# Patient Record
Sex: FEMALE | Race: OTHER | HISPANIC OR LATINO | URBAN - METROPOLITAN AREA
[De-identification: names, ages, dates, MRNs, and addresses within clinical notes are randomized per-mention and may not be internally consistent; named-entity substitution may affect disease eponyms.]

---

## 2019-08-23 ENCOUNTER — INPATIENT (INPATIENT)
Facility: HOSPITAL | Age: 74
LOS: 4 days | Discharge: HOME | DRG: 419 | End: 2019-08-28
Attending: SURGERY | Admitting: SURGERY
Payer: MEDICARE

## 2019-08-23 VITALS
RESPIRATION RATE: 16 BRPM | WEIGHT: 115.96 LBS | SYSTOLIC BLOOD PRESSURE: 147 MMHG | HEIGHT: 60 IN | HEART RATE: 76 BPM | OXYGEN SATURATION: 99 % | TEMPERATURE: 99 F | DIASTOLIC BLOOD PRESSURE: 64 MMHG

## 2019-08-23 DIAGNOSIS — K80.20 CALCULUS OF GALLBLADDER WITHOUT CHOLECYSTITIS WITHOUT OBSTRUCTION: ICD-10-CM

## 2019-08-23 LAB
ALBUMIN SERPL ELPH-MCNC: 3.3 G/DL — SIGNIFICANT CHANGE UP (ref 3.3–5)
ALP SERPL-CCNC: 162 U/L — HIGH (ref 30–120)
ALT FLD-CCNC: 388 U/L DA — HIGH (ref 10–60)
AMYLASE P1 CFR SERPL: 72 U/L — SIGNIFICANT CHANGE UP (ref 25–125)
ANION GAP SERPL CALC-SCNC: 6 MMOL/L — SIGNIFICANT CHANGE UP (ref 5–17)
APPEARANCE UR: CLEAR — SIGNIFICANT CHANGE UP
AST SERPL-CCNC: 212 U/L — HIGH (ref 10–40)
BASOPHILS # BLD AUTO: 0.05 K/UL — SIGNIFICANT CHANGE UP (ref 0–0.2)
BASOPHILS NFR BLD AUTO: 0.8 % — SIGNIFICANT CHANGE UP (ref 0–2)
BILIRUB SERPL-MCNC: 0.4 MG/DL — SIGNIFICANT CHANGE UP (ref 0.2–1.2)
BILIRUB UR-MCNC: NEGATIVE — SIGNIFICANT CHANGE UP
BUN SERPL-MCNC: 35 MG/DL — HIGH (ref 7–23)
CALCIUM SERPL-MCNC: 8.7 MG/DL — SIGNIFICANT CHANGE UP (ref 8.4–10.5)
CHLORIDE SERPL-SCNC: 107 MMOL/L — SIGNIFICANT CHANGE UP (ref 96–108)
CO2 SERPL-SCNC: 26 MMOL/L — SIGNIFICANT CHANGE UP (ref 22–31)
COLOR SPEC: YELLOW — SIGNIFICANT CHANGE UP
CREAT SERPL-MCNC: 0.94 MG/DL — SIGNIFICANT CHANGE UP (ref 0.5–1.3)
DIFF PNL FLD: NEGATIVE — SIGNIFICANT CHANGE UP
EOSINOPHIL # BLD AUTO: 0.2 K/UL — SIGNIFICANT CHANGE UP (ref 0–0.5)
EOSINOPHIL NFR BLD AUTO: 3.2 % — SIGNIFICANT CHANGE UP (ref 0–6)
GLUCOSE SERPL-MCNC: 101 MG/DL — HIGH (ref 70–99)
GLUCOSE UR QL: NEGATIVE MG/DL — SIGNIFICANT CHANGE UP
HCT VFR BLD CALC: 34 % — LOW (ref 34.5–45)
HGB BLD-MCNC: 11.1 G/DL — LOW (ref 11.5–15.5)
IMM GRANULOCYTES NFR BLD AUTO: 0.2 % — SIGNIFICANT CHANGE UP (ref 0–1.5)
KETONES UR-MCNC: NEGATIVE — SIGNIFICANT CHANGE UP
LACTATE SERPL-SCNC: 0.5 MMOL/L — LOW (ref 0.7–2)
LEUKOCYTE ESTERASE UR-ACNC: NEGATIVE — SIGNIFICANT CHANGE UP
LIDOCAIN IGE QN: 100 U/L — SIGNIFICANT CHANGE UP (ref 73–393)
LYMPHOCYTES # BLD AUTO: 2.36 K/UL — SIGNIFICANT CHANGE UP (ref 1–3.3)
LYMPHOCYTES # BLD AUTO: 37.9 % — SIGNIFICANT CHANGE UP (ref 13–44)
MCHC RBC-ENTMCNC: 29.1 PG — SIGNIFICANT CHANGE UP (ref 27–34)
MCHC RBC-ENTMCNC: 32.6 GM/DL — SIGNIFICANT CHANGE UP (ref 32–36)
MCV RBC AUTO: 89.2 FL — SIGNIFICANT CHANGE UP (ref 80–100)
MONOCYTES # BLD AUTO: 0.83 K/UL — SIGNIFICANT CHANGE UP (ref 0–0.9)
MONOCYTES NFR BLD AUTO: 13.3 % — SIGNIFICANT CHANGE UP (ref 2–14)
NEUTROPHILS # BLD AUTO: 2.78 K/UL — SIGNIFICANT CHANGE UP (ref 1.8–7.4)
NEUTROPHILS NFR BLD AUTO: 44.6 % — SIGNIFICANT CHANGE UP (ref 43–77)
NITRITE UR-MCNC: NEGATIVE — SIGNIFICANT CHANGE UP
NRBC # BLD: 0 /100 WBCS — SIGNIFICANT CHANGE UP (ref 0–0)
PH UR: 6.5 — SIGNIFICANT CHANGE UP (ref 5–8)
PLATELET # BLD AUTO: 278 K/UL — SIGNIFICANT CHANGE UP (ref 150–400)
POTASSIUM SERPL-MCNC: 5.2 MMOL/L — SIGNIFICANT CHANGE UP (ref 3.5–5.3)
POTASSIUM SERPL-SCNC: 5.2 MMOL/L — SIGNIFICANT CHANGE UP (ref 3.5–5.3)
PROT SERPL-MCNC: 7.3 G/DL — SIGNIFICANT CHANGE UP (ref 6–8.3)
PROT UR-MCNC: NEGATIVE MG/DL — SIGNIFICANT CHANGE UP
RBC # BLD: 3.81 M/UL — SIGNIFICANT CHANGE UP (ref 3.8–5.2)
RBC # FLD: 13.3 % — SIGNIFICANT CHANGE UP (ref 10.3–14.5)
SODIUM SERPL-SCNC: 139 MMOL/L — SIGNIFICANT CHANGE UP (ref 135–145)
SP GR SPEC: 1.01 — SIGNIFICANT CHANGE UP (ref 1.01–1.02)
UROBILINOGEN FLD QL: NEGATIVE MG/DL — SIGNIFICANT CHANGE UP
WBC # BLD: 6.23 K/UL — SIGNIFICANT CHANGE UP (ref 3.8–10.5)
WBC # FLD AUTO: 6.23 K/UL — SIGNIFICANT CHANGE UP (ref 3.8–10.5)

## 2019-08-23 PROCEDURE — 99285 EMERGENCY DEPT VISIT HI MDM: CPT

## 2019-08-23 PROCEDURE — 71046 X-RAY EXAM CHEST 2 VIEWS: CPT | Mod: 26

## 2019-08-23 PROCEDURE — 93010 ELECTROCARDIOGRAM REPORT: CPT

## 2019-08-23 PROCEDURE — 76700 US EXAM ABDOM COMPLETE: CPT | Mod: 26

## 2019-08-23 RX ORDER — PIPERACILLIN AND TAZOBACTAM 4; .5 G/20ML; G/20ML
3.38 INJECTION, POWDER, LYOPHILIZED, FOR SOLUTION INTRAVENOUS ONCE
Refills: 0 | Status: COMPLETED | OUTPATIENT
Start: 2019-08-23 | End: 2019-08-23

## 2019-08-23 RX ORDER — MORPHINE SULFATE 50 MG/1
2 CAPSULE, EXTENDED RELEASE ORAL EVERY 4 HOURS
Refills: 0 | Status: DISCONTINUED | OUTPATIENT
Start: 2019-08-23 | End: 2019-08-28

## 2019-08-23 RX ORDER — SODIUM CHLORIDE 9 MG/ML
1000 INJECTION INTRAMUSCULAR; INTRAVENOUS; SUBCUTANEOUS ONCE
Refills: 0 | Status: COMPLETED | OUTPATIENT
Start: 2019-08-23 | End: 2019-08-23

## 2019-08-23 RX ORDER — LISINOPRIL 2.5 MG/1
40 TABLET ORAL DAILY
Refills: 0 | Status: DISCONTINUED | OUTPATIENT
Start: 2019-08-23 | End: 2019-08-27

## 2019-08-23 RX ORDER — CIPROFLOXACIN LACTATE 400MG/40ML
400 VIAL (ML) INTRAVENOUS EVERY 12 HOURS
Refills: 0 | Status: DISCONTINUED | OUTPATIENT
Start: 2019-08-23 | End: 2019-08-28

## 2019-08-23 RX ORDER — SODIUM CHLORIDE 9 MG/ML
1000 INJECTION, SOLUTION INTRAVENOUS
Refills: 0 | Status: DISCONTINUED | OUTPATIENT
Start: 2019-08-23 | End: 2019-08-28

## 2019-08-23 RX ORDER — ENOXAPARIN SODIUM 100 MG/ML
40 INJECTION SUBCUTANEOUS DAILY
Refills: 0 | Status: DISCONTINUED | OUTPATIENT
Start: 2019-08-24 | End: 2019-08-28

## 2019-08-23 RX ADMIN — PIPERACILLIN AND TAZOBACTAM 200 GRAM(S): 4; .5 INJECTION, POWDER, LYOPHILIZED, FOR SOLUTION INTRAVENOUS at 20:23

## 2019-08-23 RX ADMIN — SODIUM CHLORIDE 250 MILLILITER(S): 9 INJECTION INTRAMUSCULAR; INTRAVENOUS; SUBCUTANEOUS at 20:23

## 2019-08-23 NOTE — ED PROVIDER NOTE - ATTENDING CONTRIBUTION TO CARE
Scribe Alexa Bromberg for attending Dr. Ramirez: 73 y/o female with a PMHx of HTN presents to the ED for abnormal lab results. Two weeks ago she had blood tests that said she had an abnormal liver. Blood tests were drawn for a routine colonoscopy. Doctor had her get an US which was fine but they saw gallstones. Yesterday pt went back for an annual physical and the blood work showed even more elevations in liver enzymes. Pt didn't have the colonoscopy because they saw that her heart was slightly off. Doctor wanted pt to see cardiologist. Pt denies fever but her feet get cold. Pt denies cp or SOB. Pt states the right side of her abd is tender. Pt has had no n/v/d. Sometimes the abd pain gets worse after she eats but not all the time. No EtOH. Nonsmoker. 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 Will obtain screening labs, gallbladder sono, and monitor. Scribe Alexa Bromberg for attending Dr. Ramirez: 73 y/o female with a PMHx of HTN presents to the ED for abnormal lab results. Two weeks ago she had routine blood work for a colonoscopy and were told that her LFTs were elevated.  Her PMD had her follow up for an ultrasound and she was told that gallstones were noted. Yesterday pt went back for an annual physical and the blood work showed her LFTs were double what they were on the prior tests per report. Pt has not had the colonoscopy yet. . Pt denies fevers but reports chills. Pt denies cp or SOB. Pt states the right side of her abd is tender. Pt has had no n/v/d. Sometimes the abd pain gets worse after she eats but not all the time. No EtOH. Nonsmoker. 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 MDM- 73 y/o with isolated transaminitis and upper abd pain appears to be worsening over the last several days although we do not have a baseline. Will obtain screening labs, gallbladder sono, and monitor.  Agree with above plan of care

## 2019-08-23 NOTE — H&P ADULT - NSHPLABSRESULTS_GEN_ALL_CORE
< from: US Abdomen Complete (08.23.19 @ 19:12) >    XAM:  US ABDOMEN COMPLETE                                  PROCEDURE DATE:  08/23/2019          INTERPRETATION:  Ultrasound of the abdomen       CLINICAL INFORMATION:  Abdominal  Pain., Elevated LFTs    TECHNIQUE:   Transcutaneous ultrasonography of the abdomen was performed.    FINDINGS:    No previous examinations are available for review.    There is diffuse fatty infiltration of liver parenchyma.    Hepatic size and contours are maintained.  Hepatic and portal veins   appear patent and arenot displaced.  No intrahepatic or ductal   dilatation is found.  The common duct  measures 0.6 cm.       The gallbladder  partially contracted with echogenic 3.8 mm foci near the   neck of the gallbladder which may represent a sludge ball or gallstone.    Gallbladder wall measures 3 mm.     The pancreatic head, neck, and proximal body are intact. The midbody and   tail of pancreas are obscured by bowel gas.   The spleen size not visualized due to bowel gas.    The right kidney measures 9.9 cm inlength.  It demonstrates no mass,   calculus or hydronephrosis.      The left kidney measures 9.3 cm in length.  It demonstrates no mass,   calculus or hydronephrosis.  No ascites.  Visualized segments of abdominal aorta are within normal limits by   sonographic criteria. IVC is unremarkable. No retroperitoneal mass seen.      IMPRESSION:   Gallbladder contracted with echogenic foci within   gallbladder neck either representing gallstone or sludge ball.   Gallbladder wall 3 mm. Common bile duct 6mm.  Fatty infiltration of liver.  Unable to visualize spleen due to bowel gas.                    BRODERICK RANKIN M.D., ATTENDING RADIOLOGIST  This document has been electronically signed. Aug 23 2019  7:41PM              < end of copied text >

## 2019-08-23 NOTE — ED ADULT NURSE NOTE - OBJECTIVE STATEMENT
Amb to ED. Pt's daughter states she received a phone call today that labs drawn yesterday for her physical had elevated liver enzymes. Pt denies any pain. No nausea, fever or diarrhea. Color fair. Skin warm & dry to touch. Lungs clear. Abd soft with intermittent mild tenderness in RLQ with palpation.

## 2019-08-23 NOTE — ED PROVIDER NOTE - CHPI ED SYMPTOMS NEG
no vomiting/no burning urination/no diarrhea/no fever/no chills/no dysuria/no hematuria/no nausea/no blood in stool

## 2019-08-23 NOTE — ED PROVIDER NOTE - INTERPRETATION SERVICES DECLINED
Lab appointment already scheduled.     Routed to provider for orders for annual labs.    Trinh Young RN  [facility]     Patient/Caregiver requests family/friend to interpret.

## 2019-08-23 NOTE — ED PROVIDER NOTE - OBJECTIVE STATEMENT
73 y/o F with hx of HTN, gastritis referred by PMD for eval of elevated LFTs and potassium x 1 day. As per daughter at bedside, pt had routine check up with PMD, Dr. Morgan Roe yesterday and noted lfts were elevated (ast: 346, ast: 210 and alk phos: 156) potassium was 5.7. States that pt was supposed to have colonoscopy and had pre procedure labs with her GI, Dr. Smith 2 weeks ago, noted to have elevated lfts, had abdominal sono which showed a gallstone but states that lfts are more elevated on blood test from yesterday. States that pt has hx of intermittent R upper abdominal pain radiating to back but worse over the past few days. Denies hx of abdominal surgeries, fever, chills, n/v/d, urinary symptoms, CP, SOB or other symptoms.

## 2019-08-23 NOTE — H&P ADULT - ASSESSMENT
IMPRESSION cholecystitis and lithiasis  PLAN cardiac evaluation            check repeat LFT's           GI evaluation

## 2019-08-23 NOTE — ED ADULT NURSE NOTE - NS ED NURSE RECORD ANOTHER HT AND WT
Problem: Impaired Functional Mobility  Goal: Achieve highest/safest level of mobility/gait  Interventions:  - Assess patient's functional ability and stability  - Promote increasing activity/tolerance for mobility and gait  - Educate and engage patient/fam Yes

## 2019-08-23 NOTE — ED PROVIDER NOTE - CLINICAL SUMMARY MEDICAL DECISION MAKING FREE TEXT BOX
73 y/o F with hx of HTN sent by pmd for elevated lfts and potassium, has had upper abdominal pain, worsening, noted to have elevated lfts and gallstone on US 2 weeks ago by GI, no fever/n/v/d, will get labs, abdominal US, surgical consult, re-assess

## 2019-08-23 NOTE — ED PROVIDER NOTE - PROGRESS NOTE DETAILS
Scribe Alexa Bromberg for attending Dr. Ramirez: 75 y/o female with a PMHx of HTN presents to the ED for abnormal lab results. Two weeks ago she had blood tests that said she had an abnormal liver. Blood tests were drawn for a routine colonoscopy. Doctor had her get an US which was fine but they saw gallstones. Yesterday pt went back for an annual physical and the blood work showed even more elevations in liver enzymes. Pt didn't have the colonoscopy because they saw that her heart was slightly off. Doctor wanted pt to see cardiologist. Pt denies fever but her feet get cold. Pt denies cp or SOB. Pt states the right side of her abd is tender. Pt has had no n/v/d. Sometimes the abd pain gets worse after she eats but not all the time. No EtOH. Nonsmoker. 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 Will obtain screening labs, gallbladder sono, and monitor. Spoke to surgeon, Dr. Polo who advised to admit pt to his service, he will call GI for consult and ERCP. Pt examined by ED attending, Dr. Carrizales who agreed with disposition and plan. Spoke to surgeon, Dr. Polo who advised to admit pt to his service, he will call GI for consult and ERCP.  Pt refused pain meds in ED.

## 2019-08-23 NOTE — H&P ADULT - HISTORY OF PRESENT ILLNESS
74 year old female who presents c/o abdominal pain and elevated LFT's.  Pt states that over the last 6 months she has been having abdominal pain- her pain was on the right flank with radiation  to her back.  Denies fevers, chills or night sweats.  Has no history of jaundice but had elevated LFT's on routine lab work.  No family history of cholelithiasis.

## 2019-08-24 LAB
ALBUMIN SERPL ELPH-MCNC: 2.8 G/DL — LOW (ref 3.3–5)
ALP SERPL-CCNC: 129 U/L — HIGH (ref 30–120)
ALT FLD-CCNC: 338 U/L DA — HIGH (ref 10–60)
AMYLASE P1 CFR SERPL: 68 U/L — SIGNIFICANT CHANGE UP (ref 25–125)
ANION GAP SERPL CALC-SCNC: 6 MMOL/L — SIGNIFICANT CHANGE UP (ref 5–17)
AST SERPL-CCNC: 179 U/L — HIGH (ref 10–40)
BILIRUB SERPL-MCNC: 0.3 MG/DL — SIGNIFICANT CHANGE UP (ref 0.2–1.2)
BUN SERPL-MCNC: 25 MG/DL — HIGH (ref 7–23)
CALCIUM SERPL-MCNC: 8.6 MG/DL — SIGNIFICANT CHANGE UP (ref 8.4–10.5)
CHLORIDE SERPL-SCNC: 111 MMOL/L — HIGH (ref 96–108)
CO2 SERPL-SCNC: 26 MMOL/L — SIGNIFICANT CHANGE UP (ref 22–31)
CREAT SERPL-MCNC: 1.03 MG/DL — SIGNIFICANT CHANGE UP (ref 0.5–1.3)
GLUCOSE SERPL-MCNC: 87 MG/DL — SIGNIFICANT CHANGE UP (ref 70–99)
HCT VFR BLD CALC: 32.9 % — LOW (ref 34.5–45)
HCV AB S/CO SERPL IA: 0.06 S/CO — SIGNIFICANT CHANGE UP (ref 0–0.99)
HCV AB SERPL-IMP: SIGNIFICANT CHANGE UP
HGB BLD-MCNC: 10.9 G/DL — LOW (ref 11.5–15.5)
LIDOCAIN IGE QN: 86 U/L — SIGNIFICANT CHANGE UP (ref 73–393)
MCHC RBC-ENTMCNC: 29.1 PG — SIGNIFICANT CHANGE UP (ref 27–34)
MCHC RBC-ENTMCNC: 33.1 GM/DL — SIGNIFICANT CHANGE UP (ref 32–36)
MCV RBC AUTO: 88 FL — SIGNIFICANT CHANGE UP (ref 80–100)
NRBC # BLD: 0 /100 WBCS — SIGNIFICANT CHANGE UP (ref 0–0)
PLATELET # BLD AUTO: 295 K/UL — SIGNIFICANT CHANGE UP (ref 150–400)
POTASSIUM SERPL-MCNC: 4.5 MMOL/L — SIGNIFICANT CHANGE UP (ref 3.5–5.3)
POTASSIUM SERPL-SCNC: 4.5 MMOL/L — SIGNIFICANT CHANGE UP (ref 3.5–5.3)
PROT SERPL-MCNC: 6.1 G/DL — SIGNIFICANT CHANGE UP (ref 6–8.3)
RBC # BLD: 3.74 M/UL — LOW (ref 3.8–5.2)
RBC # FLD: 13.3 % — SIGNIFICANT CHANGE UP (ref 10.3–14.5)
SODIUM SERPL-SCNC: 143 MMOL/L — SIGNIFICANT CHANGE UP (ref 135–145)
WBC # BLD: 6.31 K/UL — SIGNIFICANT CHANGE UP (ref 3.8–10.5)
WBC # FLD AUTO: 6.31 K/UL — SIGNIFICANT CHANGE UP (ref 3.8–10.5)

## 2019-08-24 RX ADMIN — Medication 200 MILLIGRAM(S): at 05:50

## 2019-08-24 RX ADMIN — LISINOPRIL 40 MILLIGRAM(S): 2.5 TABLET ORAL at 05:50

## 2019-08-24 RX ADMIN — SODIUM CHLORIDE 50 MILLILITER(S): 9 INJECTION, SOLUTION INTRAVENOUS at 19:51

## 2019-08-24 RX ADMIN — Medication 200 MILLIGRAM(S): at 18:06

## 2019-08-24 RX ADMIN — SODIUM CHLORIDE 50 MILLILITER(S): 9 INJECTION, SOLUTION INTRAVENOUS at 05:51

## 2019-08-24 RX ADMIN — ENOXAPARIN SODIUM 40 MILLIGRAM(S): 100 INJECTION SUBCUTANEOUS at 18:07

## 2019-08-24 NOTE — CONSULT NOTE ADULT - ASSESSMENT
cbd stone  gb disease  avoid all non essential hepatotoxic drugs	  Patient presented with persistent abnormal LFT  Obtain ABD sonogram  Check viral hepatitis panel  BERTIN,ASMA,AMA  Iron studies, celiac serologies  AFP, alpha 1 antitrypsin level, ceruloplasmin    Potential liver biopsy in future may be needed if persistent elevated liver function.    diet and wt control  avoid all hepatotoxic drugs also  avoid alcohol and herbel medication.  follow up liver function test.    mrcp to be done

## 2019-08-24 NOTE — CONSULT NOTE ADULT - SUBJECTIVE AND OBJECTIVE BOX
History of Present Illness: The patient is a 74 year old female with a history of HTN, gastritis who presented with abnormal LFTs. She had been having intermittent abdominal pain. She denies chest pain or shortness of breath. She was told by her PMD that she had a change in her ECG from prior but unable to describe further.    Past Medical/Surgical History:  HTN, gastritis    Medications:  Home Medications:  chlorthalidone 25 mg oral tablet: 1 tab(s) orally once a day (23 Aug 2019 20:18)  lisinopril 40 mg oral tablet: 1 tab(s) orally once a day (23 Aug 2019 20:18)      Family History: Non-contributory family history of premature cardiovascular atherosclerotic disease    Social History: No tobacco, alcohol or drug use    Review of Systems:  General: No fevers, chills, weight loss or gain  Skin: No rashes, color changes  Cardiovascular: No chest pain, orthopnea  Respiratory: No shortness of breath, cough  Gastrointestinal: No nausea, abdominal pain  Genitourinary: No incontinence, pain with urination  Musculoskeletal: No pain, swelling, decreased range of motion  Neurological: No headache, weakness  Psychiatric: No depression, anxiety  Endocrine: No weight loss or gain, increased thirst  All other systems are comprehensively negative.    Physical Exam:  Vitals:        Vital Signs Last 24 Hrs  T(C): 36.8 (24 Aug 2019 08:08), Max: 37.1 (23 Aug 2019 17:28)  T(F): 98.2 (24 Aug 2019 08:08), Max: 98.8 (23 Aug 2019 17:28)  HR: 59 (24 Aug 2019 08:08) (59 - 76)  BP: 117/71 (24 Aug 2019 08:08) (111/54 - 160/70)  BP(mean): --  RR: 16 (24 Aug 2019 08:08) (16 - 16)  SpO2: 98% (24 Aug 2019 08:08) (96% - 99%)  General: NAD  HEENT: MMM  Neck: No JVD, no carotid bruit  Lungs: CTAB  CV: RRR, nl S1/S2, no M/R/G  Abdomen: S/NT/ND, +BS  Extremities: No LE edema, no cyanosis  Neuro: AAOx3, non-focal  Skin: No rash    Labs:                        10.9   6.31  )-----------( 295      ( 24 Aug 2019 06:46 )             32.9     08-24    143  |  111<H>  |  25<H>  ----------------------------<  87  4.5   |  26  |  1.03    Ca    8.6      24 Aug 2019 06:46    TPro  6.1  /  Alb  2.8<L>  /  TBili  0.3  /  DBili  x   /  AST  179<H>  /  ALT  338<H>  /  AlkPhos  129<H>  08-24            ECG: NSR, normal axis, PAC, RBBB

## 2019-08-24 NOTE — CONSULT NOTE ADULT - SUBJECTIVE AND OBJECTIVE BOX
Chief Complaint:  Patient is a 74y old  Female who presents with a chief complaint of increased LFT's, abdominal pain 74 year old female who presents c/o abdominal pain and elevated LFT's.  Pt states that over the last 6 months she has been having abdominal pain- her pain was on the right flank with radiation  to her back.  Denies fevers, chills or night sweats.  Has no history of jaundice but had elevated LFT's on routine lab work.  No family history of cholelithiasis.     Review of Systems:  · Negative General Symptoms	no fever; no chills	  · Skin/Breast	negative	  · Ophthalmologic	negative	  · ENMT Comments	s/p cataracts	  · Respiratory and Thorax	negative	  · Cardiovascular	negative	  · Negative Gastrointestinal Symptoms	no nausea; no vomiting; no change in bowel habits	  · Gastrointestinal Symptoms	abdominal pain	  · Genitourinary	negative	  · Musculoskeletal	negative	  · Neurological	negative	  · Psychiatric	negative	  · Hematology/Lymphatics	negative	  · Endocrine	negative	  · Allergic/Immunologic	negative	  inc lfts ? cbd stone    Allergies:  No Known Allergies      Medications:  ciprofloxacin   IVPB 400 milliGRAM(s) IV Intermittent every 12 hours  enoxaparin Injectable 40 milliGRAM(s) SubCutaneous daily  lactated ringers. 1000 milliLiter(s) IV Continuous <Continuous>  lisinopril 40 milliGRAM(s) Oral daily  morphine  - Injectable 2 milliGRAM(s) IV Push every 4 hours PRN      PMHX/PSHX:  Hypertension, unspecified type  No significant past surgical history      Family history:  no uc no cd liver disease    Social History: no etoh no cigs no ivda    ROS:     General:  No wt loss, fevers, chills, night sweats, fatigue,   Eyes:  Good vision, no reported pain  ENT:  No sore throat, pain, runny nose, dysphagia  CV:  No pain, palpitations, hypo/hypertension  Resp:  No dyspnea, cough, tachypnea, wheezing  GI:  No pain, No nausea, No vomiting, No diarrhea, No constipation, No weight loss, No fever, No pruritis, No rectal bleeding, No tarry stools, No dysphagia,  :  No pain, bleeding, incontinence, nocturia  Muscle:  No pain, weakness  Neuro:  No weakness, tingling, memory problems  Psych:  No fatigue, insomnia, mood problems, depression  Endocrine:  No polyuria, polydipsia, cold/heat intolerance  Heme:  No petechiae, ecchymosis, easy bruisability  Skin:  No rash, tattoos, scars, edema      PHYSICAL EXAM:   Vital Signs:  Vital Signs Last 24 Hrs  T(C): 36.8 (24 Aug 2019 08:08), Max: 37.1 (23 Aug 2019 17:28)  T(F): 98.2 (24 Aug 2019 08:08), Max: 98.8 (23 Aug 2019 17:28)  HR: 59 (24 Aug 2019 08:08) (59 - 76)  BP: 117/71 (24 Aug 2019 08:08) (111/54 - 160/70)  BP(mean): --  RR: 16 (24 Aug 2019 08:08) (16 - 16)  SpO2: 98% (24 Aug 2019 08:08) (96% - 99%)  Daily Height in cm: 152.4 (23 Aug 2019 17:28)    Daily     GENERAL:  Appears stated age, well-groomed, well-nourished, no distress  HEENT:  NC/AT,  conjunctivae clear and pink, no thyromegaly, nodules, adenopathy, no JVD, sclera -anicteric  CHEST:  Full & symmetric excursion, no increased effort, breath sounds clear  HEART:  Regular rhythm, S1, S2, no murmur/rub/S3/S4, no abdominal bruit, no edema  ABDOMEN:  Soft, non-tender, non-distended, normoactive bowel sounds,  no masses ,no hepato-splenomegaly, no signs of chronic liver disease  EXTEREMITIES:  no cyanosis,clubbing or edema  SKIN:  No rash/erythema/ecchymoses/petechiae/wounds/abscess/warm/dry  NEURO:  Alert, oriented, no asterixis, no tremor, no encephalopathy    LABS:                        10.9   6.31  )-----------( 295      ( 24 Aug 2019 06:46 )             32.9     08-24    143  |  111<H>  |  25<H>  ----------------------------<  87  4.5   |  26  |  1.03    Ca    8.6      24 Aug 2019 06:46    TPro  6.1  /  Alb  2.8<L>  /  TBili  0.3  /  DBili  x   /  AST  179<H>  /  ALT  338<H>  /  AlkPhos  129<H>  08-    LIVER FUNCTIONS - ( 24 Aug 2019 06:46 )  Alb: 2.8 g/dL / Pro: 6.1 g/dL / ALK PHOS: 129 U/L / ALT: 338 U/L DA / AST: 179 U/L / GGT: x             Urinalysis Basic - ( 23 Aug 2019 20:37 )    Color: Yellow / Appearance: Clear / S.010 / pH: x  Gluc: x / Ketone: Negative  / Bili: Negative / Urobili: Negative mg/dL   Blood: x / Protein: Negative mg/dL / Nitrite: Negative   Leuk Esterase: Negative / RBC: x / WBC x   Sq Epi: x / Non Sq Epi: x / Bacteria: x      Amylase Serum68      Lipase serum86       Ammonia--  Amylase Serum72      Lipase [id number]       Ammonia--      Imaging:

## 2019-08-24 NOTE — CONSULT NOTE ADULT - ASSESSMENT
The patient is a 74 year old female with a history of HTN, gastritis who presented with abnormal LFTs, found to have cholecystitis.    Plan:  - ECG with RBBB, likely normal variant. No evidence of ischemia or infarction.  - Continue lisinopril 40 mg daily  - GI eval  - Plan for possible cholecystectomy. There are no active cardiac issues and the patient is asymptomatic from a cardiac perspective. She is at low/intermediate risk for cardiac events for an intermediate risk surgery. She is optimized to proceed without additional cardiac testing.

## 2019-08-25 LAB
ALBUMIN SERPL ELPH-MCNC: 3.1 G/DL — LOW (ref 3.3–5)
ALP SERPL-CCNC: 135 U/L — HIGH (ref 30–120)
ALT FLD-CCNC: 340 U/L DA — HIGH (ref 10–60)
AMYLASE P1 CFR SERPL: 66 U/L — SIGNIFICANT CHANGE UP (ref 25–125)
ANION GAP SERPL CALC-SCNC: 7 MMOL/L — SIGNIFICANT CHANGE UP (ref 5–17)
AST SERPL-CCNC: 181 U/L — HIGH (ref 10–40)
BILIRUB SERPL-MCNC: 0.3 MG/DL — SIGNIFICANT CHANGE UP (ref 0.2–1.2)
BLD GP AB SCN SERPL QL: SIGNIFICANT CHANGE UP
BLD GP AB SCN SERPL QL: SIGNIFICANT CHANGE UP
BUN SERPL-MCNC: 19 MG/DL — SIGNIFICANT CHANGE UP (ref 7–23)
CALCIUM SERPL-MCNC: 9.1 MG/DL — SIGNIFICANT CHANGE UP (ref 8.4–10.5)
CHLORIDE SERPL-SCNC: 109 MMOL/L — HIGH (ref 96–108)
CO2 SERPL-SCNC: 26 MMOL/L — SIGNIFICANT CHANGE UP (ref 22–31)
CREAT SERPL-MCNC: 1.07 MG/DL — SIGNIFICANT CHANGE UP (ref 0.5–1.3)
GLUCOSE SERPL-MCNC: 97 MG/DL — SIGNIFICANT CHANGE UP (ref 70–99)
HCT VFR BLD CALC: 34.8 % — SIGNIFICANT CHANGE UP (ref 34.5–45)
HGB BLD-MCNC: 11.5 G/DL — SIGNIFICANT CHANGE UP (ref 11.5–15.5)
INR BLD: 1.05 RATIO — SIGNIFICANT CHANGE UP (ref 0.88–1.16)
LIDOCAIN IGE QN: 92 U/L — SIGNIFICANT CHANGE UP (ref 73–393)
MCHC RBC-ENTMCNC: 28.9 PG — SIGNIFICANT CHANGE UP (ref 27–34)
MCHC RBC-ENTMCNC: 33 GM/DL — SIGNIFICANT CHANGE UP (ref 32–36)
MCV RBC AUTO: 87.4 FL — SIGNIFICANT CHANGE UP (ref 80–100)
NRBC # BLD: 0 /100 WBCS — SIGNIFICANT CHANGE UP (ref 0–0)
PLATELET # BLD AUTO: 333 K/UL — SIGNIFICANT CHANGE UP (ref 150–400)
POTASSIUM SERPL-MCNC: 4.5 MMOL/L — SIGNIFICANT CHANGE UP (ref 3.5–5.3)
POTASSIUM SERPL-SCNC: 4.5 MMOL/L — SIGNIFICANT CHANGE UP (ref 3.5–5.3)
PROT SERPL-MCNC: 6.7 G/DL — SIGNIFICANT CHANGE UP (ref 6–8.3)
PROTHROM AB SERPL-ACNC: 11.5 SEC — SIGNIFICANT CHANGE UP (ref 10–12.9)
RBC # BLD: 3.98 M/UL — SIGNIFICANT CHANGE UP (ref 3.8–5.2)
RBC # FLD: 13.2 % — SIGNIFICANT CHANGE UP (ref 10.3–14.5)
SODIUM SERPL-SCNC: 142 MMOL/L — SIGNIFICANT CHANGE UP (ref 135–145)
WBC # BLD: 6.3 K/UL — SIGNIFICANT CHANGE UP (ref 3.8–10.5)
WBC # FLD AUTO: 6.3 K/UL — SIGNIFICANT CHANGE UP (ref 3.8–10.5)

## 2019-08-25 RX ADMIN — Medication 200 MILLIGRAM(S): at 05:23

## 2019-08-25 RX ADMIN — SODIUM CHLORIDE 50 MILLILITER(S): 9 INJECTION, SOLUTION INTRAVENOUS at 19:42

## 2019-08-25 RX ADMIN — LISINOPRIL 40 MILLIGRAM(S): 2.5 TABLET ORAL at 05:23

## 2019-08-25 RX ADMIN — Medication 200 MILLIGRAM(S): at 17:45

## 2019-08-25 RX ADMIN — ENOXAPARIN SODIUM 40 MILLIGRAM(S): 100 INJECTION SUBCUTANEOUS at 12:13

## 2019-08-25 NOTE — DIETITIAN INITIAL EVALUATION ADULT. - PHYSICAL APPEARANCE
BMI 22.7/well nourished BMI 22.7 indicates underweight for age. Pt appears thin without irena prominences.

## 2019-08-25 NOTE — DIETITIAN INITIAL EVALUATION ADULT. - OTHER INFO
75yo F pt admitted with cholecystitis and lithiasis. Pt will be NPO at midnight pending mrcp. Possible cholecystectomy. Pt reports CBW 116lb. Reports UBW 124lb. This indicates -6.5% weight loss over the last year, not significant. Pt unsure of reason for weight loss. Reports no changes in her appetite and intake of meals PTA. Consumes mostly 3 meals/day, sometimes 2 meals. Per H&P, over the last 6 months she has been having abdominal pain- her pain was on the right flank with radiation  to her back.      NFPE completed. Pt appears thin. Poor muscle tone to b/l LE and UE. Face full. Abdomen soft to touch with some adiposity. No irena prominences observed. Pt had dark spots around b/l temporal region.    Would start a mvi supplement to meet micronutrient needs. Provided pt with heart healthy diet education.

## 2019-08-26 ENCOUNTER — TRANSCRIPTION ENCOUNTER (OUTPATIENT)
Age: 74
End: 2019-08-26

## 2019-08-26 LAB
ALBUMIN SERPL ELPH-MCNC: 2.9 G/DL — LOW (ref 3.3–5)
ALP SERPL-CCNC: 124 U/L — HIGH (ref 30–120)
ALT FLD-CCNC: 365 U/L DA — HIGH (ref 10–60)
ANION GAP SERPL CALC-SCNC: 4 MMOL/L — LOW (ref 5–17)
AST SERPL-CCNC: 232 U/L — HIGH (ref 10–40)
BILIRUB SERPL-MCNC: 0.3 MG/DL — SIGNIFICANT CHANGE UP (ref 0.2–1.2)
BUN SERPL-MCNC: 17 MG/DL — SIGNIFICANT CHANGE UP (ref 7–23)
CALCIUM SERPL-MCNC: 8.8 MG/DL — SIGNIFICANT CHANGE UP (ref 8.4–10.5)
CHLORIDE SERPL-SCNC: 108 MMOL/L — SIGNIFICANT CHANGE UP (ref 96–108)
CO2 SERPL-SCNC: 28 MMOL/L — SIGNIFICANT CHANGE UP (ref 22–31)
CREAT SERPL-MCNC: 1.11 MG/DL — SIGNIFICANT CHANGE UP (ref 0.5–1.3)
GLUCOSE SERPL-MCNC: 104 MG/DL — HIGH (ref 70–99)
HCT VFR BLD CALC: 32.6 % — LOW (ref 34.5–45)
HGB BLD-MCNC: 10.9 G/DL — LOW (ref 11.5–15.5)
INR BLD: 1.1 RATIO — SIGNIFICANT CHANGE UP (ref 0.88–1.16)
MCHC RBC-ENTMCNC: 29.1 PG — SIGNIFICANT CHANGE UP (ref 27–34)
MCHC RBC-ENTMCNC: 33.4 GM/DL — SIGNIFICANT CHANGE UP (ref 32–36)
MCV RBC AUTO: 87.2 FL — SIGNIFICANT CHANGE UP (ref 80–100)
NRBC # BLD: 0 /100 WBCS — SIGNIFICANT CHANGE UP (ref 0–0)
PLATELET # BLD AUTO: 315 K/UL — SIGNIFICANT CHANGE UP (ref 150–400)
POTASSIUM SERPL-MCNC: 4.4 MMOL/L — SIGNIFICANT CHANGE UP (ref 3.5–5.3)
POTASSIUM SERPL-SCNC: 4.4 MMOL/L — SIGNIFICANT CHANGE UP (ref 3.5–5.3)
PROT SERPL-MCNC: 6.5 G/DL — SIGNIFICANT CHANGE UP (ref 6–8.3)
PROTHROM AB SERPL-ACNC: 12 SEC — SIGNIFICANT CHANGE UP (ref 10–12.9)
RBC # BLD: 3.74 M/UL — LOW (ref 3.8–5.2)
RBC # FLD: 13.2 % — SIGNIFICANT CHANGE UP (ref 10.3–14.5)
SODIUM SERPL-SCNC: 140 MMOL/L — SIGNIFICANT CHANGE UP (ref 135–145)
WBC # BLD: 5.54 K/UL — SIGNIFICANT CHANGE UP (ref 3.8–10.5)
WBC # FLD AUTO: 5.54 K/UL — SIGNIFICANT CHANGE UP (ref 3.8–10.5)

## 2019-08-26 RX ADMIN — SODIUM CHLORIDE 50 MILLILITER(S): 9 INJECTION, SOLUTION INTRAVENOUS at 17:55

## 2019-08-26 RX ADMIN — Medication 200 MILLIGRAM(S): at 17:55

## 2019-08-26 RX ADMIN — LISINOPRIL 40 MILLIGRAM(S): 2.5 TABLET ORAL at 05:22

## 2019-08-26 RX ADMIN — Medication 200 MILLIGRAM(S): at 05:22

## 2019-08-27 ENCOUNTER — RESULT REVIEW (OUTPATIENT)
Age: 74
End: 2019-08-27

## 2019-08-27 ENCOUNTER — OUTPATIENT (OUTPATIENT)
Dept: OUTPATIENT SERVICES | Facility: HOSPITAL | Age: 74
LOS: 1 days | End: 2019-08-27
Payer: COMMERCIAL

## 2019-08-27 DIAGNOSIS — Z00.00 ENCOUNTER FOR GENERAL ADULT MEDICAL EXAMINATION WITHOUT ABNORMAL FINDINGS: ICD-10-CM

## 2019-08-27 PROBLEM — I10 ESSENTIAL (PRIMARY) HYPERTENSION: Chronic | Status: ACTIVE | Noted: 2019-08-23

## 2019-08-27 LAB
ANION GAP SERPL CALC-SCNC: 13 MMOL/L — SIGNIFICANT CHANGE UP (ref 5–17)
BUN SERPL-MCNC: 16 MG/DL — SIGNIFICANT CHANGE UP (ref 7–23)
CALCIUM SERPL-MCNC: 8.4 MG/DL — SIGNIFICANT CHANGE UP (ref 8.4–10.5)
CHLORIDE SERPL-SCNC: 102 MMOL/L — SIGNIFICANT CHANGE UP (ref 96–108)
CO2 SERPL-SCNC: 23 MMOL/L — SIGNIFICANT CHANGE UP (ref 22–31)
CREAT SERPL-MCNC: 1.43 MG/DL — HIGH (ref 0.5–1.3)
GLUCOSE SERPL-MCNC: 235 MG/DL — HIGH (ref 70–99)
HCT VFR BLD CALC: 33 % — LOW (ref 34.5–45)
HGB BLD-MCNC: 10.9 G/DL — LOW (ref 11.5–15.5)
MCHC RBC-ENTMCNC: 29.1 PG — SIGNIFICANT CHANGE UP (ref 27–34)
MCHC RBC-ENTMCNC: 33 GM/DL — SIGNIFICANT CHANGE UP (ref 32–36)
MCV RBC AUTO: 88 FL — SIGNIFICANT CHANGE UP (ref 80–100)
NRBC # BLD: 0 /100 WBCS — SIGNIFICANT CHANGE UP (ref 0–0)
PLATELET # BLD AUTO: 287 K/UL — SIGNIFICANT CHANGE UP (ref 150–400)
POTASSIUM SERPL-MCNC: 3.7 MMOL/L — SIGNIFICANT CHANGE UP (ref 3.5–5.3)
POTASSIUM SERPL-SCNC: 3.7 MMOL/L — SIGNIFICANT CHANGE UP (ref 3.5–5.3)
RBC # BLD: 3.75 M/UL — LOW (ref 3.8–5.2)
RBC # FLD: 13.1 % — SIGNIFICANT CHANGE UP (ref 10.3–14.5)
SODIUM SERPL-SCNC: 138 MMOL/L — SIGNIFICANT CHANGE UP (ref 135–145)
WBC # BLD: 10.54 K/UL — HIGH (ref 3.8–10.5)
WBC # FLD AUTO: 10.54 K/UL — HIGH (ref 3.8–10.5)

## 2019-08-27 PROCEDURE — 74181 MRI ABDOMEN W/O CONTRAST: CPT | Mod: 26

## 2019-08-27 PROCEDURE — 47562 LAPAROSCOPIC CHOLECYSTECTOMY: CPT | Mod: AS

## 2019-08-27 PROCEDURE — 74181 MRI ABDOMEN W/O CONTRAST: CPT

## 2019-08-27 PROCEDURE — 83036 HEMOGLOBIN GLYCOSYLATED A1C: CPT

## 2019-08-27 PROCEDURE — 36415 COLL VENOUS BLD VENIPUNCTURE: CPT

## 2019-08-27 PROCEDURE — 88304 TISSUE EXAM BY PATHOLOGIST: CPT | Mod: 26

## 2019-08-27 RX ORDER — SODIUM CHLORIDE 9 MG/ML
1000 INJECTION, SOLUTION INTRAVENOUS
Refills: 0 | Status: DISCONTINUED | OUTPATIENT
Start: 2019-08-27 | End: 2019-08-28

## 2019-08-27 RX ORDER — OXYCODONE AND ACETAMINOPHEN 5; 325 MG/1; MG/1
1 TABLET ORAL EVERY 4 HOURS
Refills: 0 | Status: DISCONTINUED | OUTPATIENT
Start: 2019-08-27 | End: 2019-08-28

## 2019-08-27 RX ORDER — ACETAMINOPHEN 500 MG
1000 TABLET ORAL EVERY 8 HOURS
Refills: 0 | Status: DISCONTINUED | OUTPATIENT
Start: 2019-08-27 | End: 2019-08-27

## 2019-08-27 RX ORDER — CEFAZOLIN SODIUM 1 G
2000 VIAL (EA) INJECTION ONCE
Refills: 0 | Status: COMPLETED | OUTPATIENT
Start: 2019-08-27 | End: 2019-08-27

## 2019-08-27 RX ORDER — HYDROMORPHONE HYDROCHLORIDE 2 MG/ML
0.5 INJECTION INTRAMUSCULAR; INTRAVENOUS; SUBCUTANEOUS EVERY 4 HOURS
Refills: 0 | Status: DISCONTINUED | OUTPATIENT
Start: 2019-08-27 | End: 2019-08-28

## 2019-08-27 RX ORDER — CEFAZOLIN SODIUM 1 G
2000 VIAL (EA) INJECTION EVERY 8 HOURS
Refills: 0 | Status: COMPLETED | OUTPATIENT
Start: 2019-08-27 | End: 2019-08-28

## 2019-08-27 RX ORDER — ONDANSETRON 8 MG/1
4 TABLET, FILM COATED ORAL ONCE
Refills: 0 | Status: DISCONTINUED | OUTPATIENT
Start: 2019-08-27 | End: 2019-08-27

## 2019-08-27 RX ORDER — ONDANSETRON 8 MG/1
4 TABLET, FILM COATED ORAL EVERY 6 HOURS
Refills: 0 | Status: DISCONTINUED | OUTPATIENT
Start: 2019-08-27 | End: 2019-08-28

## 2019-08-27 RX ORDER — SODIUM CHLORIDE 9 MG/ML
1000 INJECTION, SOLUTION INTRAVENOUS
Refills: 0 | Status: DISCONTINUED | OUTPATIENT
Start: 2019-08-27 | End: 2019-08-27

## 2019-08-27 RX ORDER — DOCUSATE SODIUM 100 MG
100 CAPSULE ORAL THREE TIMES A DAY
Refills: 0 | Status: DISCONTINUED | OUTPATIENT
Start: 2019-08-27 | End: 2019-08-28

## 2019-08-27 RX ORDER — HYOSCYAMINE SULFATE 0.13 MG
0.12 TABLET ORAL EVERY 6 HOURS
Refills: 0 | Status: DISCONTINUED | OUTPATIENT
Start: 2019-08-27 | End: 2019-08-28

## 2019-08-27 RX ORDER — CHLORHEXIDINE GLUCONATE 213 G/1000ML
1 SOLUTION TOPICAL ONCE
Refills: 0 | Status: COMPLETED | OUTPATIENT
Start: 2019-08-27 | End: 2019-08-27

## 2019-08-27 RX ORDER — LISINOPRIL 2.5 MG/1
40 TABLET ORAL DAILY
Refills: 0 | Status: DISCONTINUED | OUTPATIENT
Start: 2019-08-27 | End: 2019-08-28

## 2019-08-27 RX ORDER — OXYCODONE AND ACETAMINOPHEN 5; 325 MG/1; MG/1
2 TABLET ORAL EVERY 6 HOURS
Refills: 0 | Status: DISCONTINUED | OUTPATIENT
Start: 2019-08-27 | End: 2019-08-28

## 2019-08-27 RX ORDER — KETOROLAC TROMETHAMINE 30 MG/ML
15 SYRINGE (ML) INJECTION EVERY 8 HOURS
Refills: 0 | Status: DISCONTINUED | OUTPATIENT
Start: 2019-08-27 | End: 2019-08-28

## 2019-08-27 RX ORDER — HYDROMORPHONE HYDROCHLORIDE 2 MG/ML
0.5 INJECTION INTRAMUSCULAR; INTRAVENOUS; SUBCUTANEOUS
Refills: 0 | Status: DISCONTINUED | OUTPATIENT
Start: 2019-08-27 | End: 2019-08-27

## 2019-08-27 RX ADMIN — Medication 100 MILLIGRAM(S): at 20:07

## 2019-08-27 RX ADMIN — SODIUM CHLORIDE 100 MILLILITER(S): 9 INJECTION, SOLUTION INTRAVENOUS at 14:44

## 2019-08-27 RX ADMIN — Medication 200 MILLIGRAM(S): at 05:46

## 2019-08-27 RX ADMIN — SODIUM CHLORIDE 50 MILLILITER(S): 9 INJECTION, SOLUTION INTRAVENOUS at 17:06

## 2019-08-27 RX ADMIN — Medication 100 MILLIGRAM(S): at 20:08

## 2019-08-27 RX ADMIN — OXYCODONE AND ACETAMINOPHEN 1 TABLET(S): 5; 325 TABLET ORAL at 20:38

## 2019-08-27 RX ADMIN — ONDANSETRON 4 MILLIGRAM(S): 8 TABLET, FILM COATED ORAL at 17:04

## 2019-08-27 RX ADMIN — SODIUM CHLORIDE 75 MILLILITER(S): 9 INJECTION, SOLUTION INTRAVENOUS at 17:07

## 2019-08-27 RX ADMIN — LISINOPRIL 40 MILLIGRAM(S): 2.5 TABLET ORAL at 05:46

## 2019-08-27 RX ADMIN — SODIUM CHLORIDE 100 MILLILITER(S): 9 INJECTION, SOLUTION INTRAVENOUS at 15:36

## 2019-08-27 RX ADMIN — Medication 200 MILLIGRAM(S): at 17:04

## 2019-08-27 RX ADMIN — CHLORHEXIDINE GLUCONATE 1 APPLICATION(S): 213 SOLUTION TOPICAL at 12:35

## 2019-08-27 RX ADMIN — OXYCODONE AND ACETAMINOPHEN 1 TABLET(S): 5; 325 TABLET ORAL at 20:08

## 2019-08-28 ENCOUNTER — TRANSCRIPTION ENCOUNTER (OUTPATIENT)
Age: 74
End: 2019-08-28

## 2019-08-28 VITALS
HEART RATE: 75 BPM | RESPIRATION RATE: 16 BRPM | SYSTOLIC BLOOD PRESSURE: 100 MMHG | TEMPERATURE: 99 F | OXYGEN SATURATION: 93 % | DIASTOLIC BLOOD PRESSURE: 55 MMHG

## 2019-08-28 LAB
ANION GAP SERPL CALC-SCNC: 9 MMOL/L — SIGNIFICANT CHANGE UP (ref 5–17)
BUN SERPL-MCNC: 17 MG/DL — SIGNIFICANT CHANGE UP (ref 7–23)
CALCIUM SERPL-MCNC: 8.1 MG/DL — LOW (ref 8.4–10.5)
CHLORIDE SERPL-SCNC: 103 MMOL/L — SIGNIFICANT CHANGE UP (ref 96–108)
CO2 SERPL-SCNC: 25 MMOL/L — SIGNIFICANT CHANGE UP (ref 22–31)
CREAT SERPL-MCNC: 1.3 MG/DL — SIGNIFICANT CHANGE UP (ref 0.5–1.3)
GLUCOSE SERPL-MCNC: 133 MG/DL — HIGH (ref 70–99)
HCT VFR BLD CALC: 28.3 % — LOW (ref 34.5–45)
HGB BLD-MCNC: 9.5 G/DL — LOW (ref 11.5–15.5)
MCHC RBC-ENTMCNC: 28.8 PG — SIGNIFICANT CHANGE UP (ref 27–34)
MCHC RBC-ENTMCNC: 33.6 GM/DL — SIGNIFICANT CHANGE UP (ref 32–36)
MCV RBC AUTO: 85.8 FL — SIGNIFICANT CHANGE UP (ref 80–100)
NRBC # BLD: 0 /100 WBCS — SIGNIFICANT CHANGE UP (ref 0–0)
PLATELET # BLD AUTO: 262 K/UL — SIGNIFICANT CHANGE UP (ref 150–400)
POTASSIUM SERPL-MCNC: 4.2 MMOL/L — SIGNIFICANT CHANGE UP (ref 3.5–5.3)
POTASSIUM SERPL-SCNC: 4.2 MMOL/L — SIGNIFICANT CHANGE UP (ref 3.5–5.3)
RBC # BLD: 3.3 M/UL — LOW (ref 3.8–5.2)
RBC # FLD: 13.1 % — SIGNIFICANT CHANGE UP (ref 10.3–14.5)
SODIUM SERPL-SCNC: 137 MMOL/L — SIGNIFICANT CHANGE UP (ref 135–145)
WBC # BLD: 10.51 K/UL — HIGH (ref 3.8–10.5)
WBC # FLD AUTO: 10.51 K/UL — HIGH (ref 3.8–10.5)

## 2019-08-28 RX ORDER — OXYCODONE AND ACETAMINOPHEN 5; 325 MG/1; MG/1
1 TABLET ORAL
Qty: 15 | Refills: 0
Start: 2019-08-28

## 2019-08-28 RX ADMIN — Medication 200 MILLIGRAM(S): at 05:30

## 2019-08-28 RX ADMIN — ONDANSETRON 4 MILLIGRAM(S): 8 TABLET, FILM COATED ORAL at 05:30

## 2019-08-28 RX ADMIN — Medication 100 MILLIGRAM(S): at 05:31

## 2019-08-28 RX ADMIN — OXYCODONE AND ACETAMINOPHEN 1 TABLET(S): 5; 325 TABLET ORAL at 01:00

## 2019-08-28 RX ADMIN — OXYCODONE AND ACETAMINOPHEN 1 TABLET(S): 5; 325 TABLET ORAL at 00:30

## 2019-08-28 RX ADMIN — ONDANSETRON 4 MILLIGRAM(S): 8 TABLET, FILM COATED ORAL at 00:29

## 2019-08-28 RX ADMIN — Medication 100 MILLIGRAM(S): at 17:09

## 2019-08-28 RX ADMIN — LISINOPRIL 40 MILLIGRAM(S): 2.5 TABLET ORAL at 05:30

## 2019-08-28 RX ADMIN — OXYCODONE AND ACETAMINOPHEN 1 TABLET(S): 5; 325 TABLET ORAL at 17:09

## 2019-08-28 RX ADMIN — OXYCODONE AND ACETAMINOPHEN 1 TABLET(S): 5; 325 TABLET ORAL at 11:57

## 2019-08-28 RX ADMIN — Medication 100 MILLIGRAM(S): at 12:48

## 2019-08-28 RX ADMIN — ONDANSETRON 4 MILLIGRAM(S): 8 TABLET, FILM COATED ORAL at 12:48

## 2019-08-28 RX ADMIN — ENOXAPARIN SODIUM 40 MILLIGRAM(S): 100 INJECTION SUBCUTANEOUS at 12:48

## 2019-08-28 RX ADMIN — Medication 100 MILLIGRAM(S): at 03:43

## 2019-08-28 RX ADMIN — OXYCODONE AND ACETAMINOPHEN 1 TABLET(S): 5; 325 TABLET ORAL at 11:27

## 2019-08-28 NOTE — BRIEF OPERATIVE NOTE - NSICDXBRIEFPREOP_GEN_ALL_CORE_FT
PRE-OP DIAGNOSIS:  Calculus of gallbladder with acute cholecystitis 28-Aug-2019 10:00:09  Carolyn Bryson

## 2019-08-28 NOTE — PROGRESS NOTE ADULT - PROVIDER SPECIALTY LIST ADULT
Cardiology
Gastroenterology
Surgery
Cardiology
Gastroenterology

## 2019-08-28 NOTE — DISCHARGE NOTE PROVIDER - HOSPITAL COURSE
PT admitted with acute cholecystitis and elevated LFT. PT admitted and started on IV abx. Pt had MRCP to r/o cbd stones, which none were found. PT underwent lap madalyn wihtout incident. PT d/c home POD#1

## 2019-08-28 NOTE — PROGRESS NOTE ADULT - REASON FOR ADMISSION
increased LFT's, abdominal pain

## 2019-08-28 NOTE — BRIEF OPERATIVE NOTE - NSICDXBRIEFPOSTOP_GEN_ALL_CORE_FT
POST-OP DIAGNOSIS:  Calculus of gallbladder with acute cholecystitis 28-Aug-2019 10:00:32  Carolyn Bryson

## 2019-08-28 NOTE — DISCHARGE NOTE NURSING/CASE MANAGEMENT/SOCIAL WORK - PATIENT PORTAL LINK FT
You can access the FollowMyHealth Patient Portal offered by [facility] by registering at the following website: [email or web address]. By joining [employer] FollowMyHealth portal, you will also be able to view your health information using other applications (apps) compatible with our system.

## 2019-08-28 NOTE — PROGRESS NOTE ADULT - SUBJECTIVE AND OBJECTIVE BOX
Chief Complaint: Abdominal pain    Interval Events: No events overnight. No complaints.    Review of Systems:  General: No fevers, chills, weight loss or gain  Skin: No rashes, color changes  Cardiovascular: No chest pain, orthopnea  Respiratory: No shortness of breath, cough  Gastrointestinal: No nausea, abdominal pain  Genitourinary: No incontinence, pain with urination  Musculoskeletal: No pain, swelling, decreased range of motion  Neurological: No headache, weakness  Psychiatric: No depression, anxiety  Endocrine: No weight loss or gain, increased thirst  All other systems are comprehensively negative.    Physical Exam:  Vital Signs Last 24 Hrs  T(C): 36.7 (26 Aug 2019 07:35), Max: 36.9 (25 Aug 2019 23:29)  T(F): 98 (26 Aug 2019 07:35), Max: 98.4 (25 Aug 2019 23:29)  HR: 63 (26 Aug 2019 07:35) (63 - 71)  BP: 136/74 (26 Aug 2019 07:35) (106/58 - 136/74)  BP(mean): --  RR: 16 (26 Aug 2019 07:35) (15 - 16)  SpO2: 96% (26 Aug 2019 07:35) (96% - 98%)  General: NAD  HEENT: MMM  Neck: No JVD, no carotid bruit  Lungs: CTAB  CV: RRR, nl S1/S2, no M/R/G  Abdomen: S/NT/ND, +BS  Extremities: No LE edema, no cyanosis  Neuro: AAOx3, non-focal  Skin: No rash    Labs:    08-26    140  |  108  |  17  ----------------------------<  104<H>  4.4   |  28  |  1.11    Ca    8.8      26 Aug 2019 08:04    TPro  6.5  /  Alb  2.9<L>  /  TBili  0.3  /  DBili  x   /  AST  232<H>  /  ALT  365<H>  /  AlkPhos  124<H>  08-26                        10.9   5.54  )-----------( 315      ( 26 Aug 2019 08:04 )             32.6
INTERVAL HPI/OVERNIGHT EVENTS:  No new overnight event.  No N/V/D.  Tolerating diet.  some ruq pain  awaiting MRI    Allergies    No Known Allergies    Intolerances    General:  No wt loss, fevers, chills, night sweats, fatigue,   Eyes:  Good vision, no reported pain  ENT:  No sore throat, pain, runny nose, dysphagia  CV:  No pain, palpitations, hypo/hypertension  Resp:  No dyspnea, cough, tachypnea, wheezing  GI:  No pain, No nausea, No vomiting, No diarrhea, No constipation, No weight loss, No fever, No pruritis, No rectal bleeding, No tarry stools, No dysphagia,  :  No pain, bleeding, incontinence, nocturia  Muscle:  No pain, weakness  Neuro:  No weakness, tingling, memory problems  Psych:  No fatigue, insomnia, mood problems, depression  Endocrine:  No polyuria, polydipsia, cold/heat intolerance  Heme:  No petechiae, ecchymosis, easy bruisability  Skin:  No rash, tattoos, scars, edema      PHYSICAL EXAM:   Vital Signs:  Vital Signs Last 24 Hrs  T(C): 36.6 (25 Aug 2019 08:08), Max: 36.7 (24 Aug 2019 15:01)  T(F): 97.9 (25 Aug 2019 08:08), Max: 98.1 (24 Aug 2019 15:01)  HR: 53 (25 Aug 2019 08:08) (53 - 64)  BP: 116/50 (25 Aug 2019 08:08) (111/46 - 119/63)  BP(mean): --  RR: 16 (25 Aug 2019 08:08) (14 - 16)  SpO2: 96% (25 Aug 2019 08:08) (96% - 98%)  Daily     Daily I&O's Summary    24 Aug 2019 07:01  -  25 Aug 2019 07:00  --------------------------------------------------------  IN: 750 mL / OUT: 0 mL / NET: 750 mL        GENERAL:  Appears stated age, well-groomed, well-nourished, no distress  HEENT:  NC/AT,  conjunctivae clear and pink, no thyromegaly, nodules, adenopathy, no JVD, sclera -anicteric  CHEST:  Full & symmetric excursion, no increased effort, breath sounds clear  HEART:  Regular rhythm, S1, S2, no murmur/rub/S3/S4, no abdominal bruit, no edema  ABDOMEN:  Soft, non-tender, non-distended, normoactive bowel sounds,  no masses ,no hepato-splenomegaly, no signs of chronic liver disease  EXTEREMITIES:  no cyanosis,clubbing or edema  SKIN:  No rash/erythema/ecchymoses/petechiae/wounds/abscess/warm/dry  NEURO:  Alert, oriented, no asterixis, no tremor, no encephalopathy      LABS:                        11.5   6.30  )-----------( 333      ( 25 Aug 2019 06:55 )             34.8         142  |  109<H>  |  19  ----------------------------<  97  4.5   |  26  |  1.07    Ca    9.1      25 Aug 2019 06:55    TPro  6.7  /  Alb  3.1<L>  /  TBili  0.3  /  DBili  x   /  AST  181<H>  /  ALT  340<H>  /  AlkPhos  135<H>        Urinalysis Basic - ( 23 Aug 2019 20:37 )    Color: Yellow / Appearance: Clear / S.010 / pH: x  Gluc: x / Ketone: Negative  / Bili: Negative / Urobili: Negative mg/dL   Blood: x / Protein: Negative mg/dL / Nitrite: Negative   Leuk Esterase: Negative / RBC: x / WBC x   Sq Epi: x / Non Sq Epi: x / Bacteria: x      amylaseAmylase, Serum Total: 66 U/L ( @ 06:55)  Amylase, Serum Total: 68 U/L ( @ 06:46)  Amylase, Serum Total: 72 U/L ( @ 18:28)     lipaseLipase, Serum: 92 U/L ( @ 06:55)  Lipase, Serum: 86 U/L ( @ 06:46)  Lipase, Serum: 100 U/L ( @ 18:28)    RADIOLOGY & ADDITIONAL TESTS:
Chief Complaint: Abdominal pain    Interval Events: No events overnight. No complaints.    Review of Systems:  General: No fevers, chills, weight loss or gain  Skin: No rashes, color changes  Cardiovascular: No chest pain, orthopnea  Respiratory: No shortness of breath, cough  Gastrointestinal: No nausea, abdominal pain  Genitourinary: No incontinence, pain with urination  Musculoskeletal: No pain, swelling, decreased range of motion  Neurological: No headache, weakness  Psychiatric: No depression, anxiety  Endocrine: No weight loss or gain, increased thirst  All other systems are comprehensively negative.    Physical Exam:  Vital Signs Last 24 Hrs  T(C): 36.7 (27 Aug 2019 07:46), Max: 36.8 (26 Aug 2019 15:00)  T(F): 98.1 (27 Aug 2019 07:46), Max: 98.3 (26 Aug 2019 15:00)  HR: 64 (27 Aug 2019 07:46) (64 - 91)  BP: 135/60 (27 Aug 2019 07:46) (93/54 - 146/82)  BP(mean): --  RR: 16 (27 Aug 2019 07:46) (16 - 16)  SpO2: 96% (27 Aug 2019 07:46) (96% - 97%)  General: NAD  HEENT: MMM  Neck: No JVD, no carotid bruit  Lungs: CTAB  CV: RRR, nl S1/S2, no M/R/G  Abdomen: S/NT/ND, +BS  Extremities: No LE edema, no cyanosis  Neuro: AAOx3, non-focal  Skin: No rash    Labs:    08-26    140  |  108  |  17  ----------------------------<  104<H>  4.4   |  28  |  1.11    Ca    8.8      26 Aug 2019 08:04    TPro  6.5  /  Alb  2.9<L>  /  TBili  0.3  /  DBili  x   /  AST  232<H>  /  ALT  365<H>  /  AlkPhos  124<H>  08-26                          10.9   5.54  )-----------( 315      ( 26 Aug 2019 08:04 )             32.6
Chief Complaint: Abdominal pain    Interval Events: No events overnight. No complaints.    Review of Systems:  General: No fevers, chills, weight loss or gain  Skin: No rashes, color changes  Cardiovascular: No chest pain, orthopnea  Respiratory: No shortness of breath, cough  Gastrointestinal: No nausea, abdominal pain  Genitourinary: No incontinence, pain with urination  Musculoskeletal: No pain, swelling, decreased range of motion  Neurological: No headache, weakness  Psychiatric: No depression, anxiety  Endocrine: No weight loss or gain, increased thirst  All other systems are comprehensively negative.    Physical Exam:  Vitals:        Vital Signs Last 24 Hrs  T(C): 36.6 (25 Aug 2019 08:08), Max: 36.7 (24 Aug 2019 15:01)  T(F): 97.9 (25 Aug 2019 08:08), Max: 98.1 (24 Aug 2019 15:01)  HR: 53 (25 Aug 2019 08:08) (53 - 64)  BP: 116/50 (25 Aug 2019 08:08) (111/46 - 119/63)  BP(mean): --  RR: 16 (25 Aug 2019 08:08) (14 - 16)  SpO2: 96% (25 Aug 2019 08:08) (96% - 98%)  General: NAD  HEENT: MMM  Neck: No JVD, no carotid bruit  Lungs: CTAB  CV: RRR, nl S1/S2, no M/R/G  Abdomen: S/NT/ND, +BS  Extremities: No LE edema, no cyanosis  Neuro: AAOx3, non-focal  Skin: No rash    Labs:                        11.5   6.30  )-----------( 333      ( 25 Aug 2019 06:55 )             34.8     08-25    142  |  109<H>  |  19  ----------------------------<  97  4.5   |  26  |  1.07    Ca    9.1      25 Aug 2019 06:55    TPro  6.7  /  Alb  3.1<L>  /  TBili  0.3  /  DBili  x   /  AST  181<H>  /  ALT  340<H>  /  AlkPhos  135<H>  08-25            Telemetry:
Chief Complaint: Abdominal pain    Interval Events: Underwent cholecystectomy yesterday. Some cough and abdominal pain.    Review of Systems:  General: No fevers, chills, weight loss or gain  Skin: No rashes, color changes  Cardiovascular: No chest pain, orthopnea  Respiratory: No shortness of breath, (+) cough  Gastrointestinal: No nausea, (+) abdominal pain  Genitourinary: No incontinence, pain with urination  Musculoskeletal: No pain, swelling, decreased range of motion  Neurological: No headache, weakness  Psychiatric: No depression, anxiety  Endocrine: No weight loss or gain, increased thirst  All other systems are comprehensively negative.    Physical Exam:  Vital Signs Last 24 Hrs  T(C): 36.7 (28 Aug 2019 07:45), Max: 37.2 (27 Aug 2019 23:29)  T(F): 98 (28 Aug 2019 07:45), Max: 98.9 (27 Aug 2019 23:29)  HR: 69 (28 Aug 2019 07:45) (63 - 90)  BP: 123/63 (28 Aug 2019 07:45) (107/73 - 141/67)  BP(mean): --  RR: 16 (28 Aug 2019 07:45) (10 - 20)  SpO2: 100% (28 Aug 2019 07:45) (93% - 100%)  General: NAD  HEENT: MMM  Neck: No JVD, no carotid bruit  Lungs: CTAB  CV: RRR, nl S1/S2, no M/R/G  Abdomen: S/NT/ND, +BS  Extremities: No LE edema, no cyanosis  Neuro: AAOx3, non-focal  Skin: No rash    Labs:    08-28    137  |  103  |  17  ----------------------------<  133<H>  4.2   |  25  |  1.30    Ca    8.1<L>      28 Aug 2019 07:42                          9.5    10.51 )-----------( 262      ( 28 Aug 2019 07:37 )             28.3
INTERVAL HPI/OVERNIGHT EVENTS:  No new overnight event.  No N/V/D.  Tolerating diet.  some ruq pain    Allergies    No Known Allergies    Intolerances    General:  No wt loss, fevers, chills, night sweats, fatigue,   Eyes:  Good vision, no reported pain  ENT:  No sore throat, pain, runny nose, dysphagia  CV:  No pain, palpitations, hypo/hypertension  Resp:  No dyspnea, cough, tachypnea, wheezing  GI:  No pain, No nausea, No vomiting, No diarrhea, No constipation, No weight loss, No fever, No pruritis, No rectal bleeding, No tarry stools, No dysphagia,  :  No pain, bleeding, incontinence, nocturia  Muscle:  No pain, weakness  Neuro:  No weakness, tingling, memory problems  Psych:  No fatigue, insomnia, mood problems, depression  Endocrine:  No polyuria, polydipsia, cold/heat intolerance  Heme:  No petechiae, ecchymosis, easy bruisability  Skin:  No rash, tattoos, scars, edema      PHYSICAL EXAM:   Vital Signs:  Vital Signs Last 24 Hrs  T(C): 36.6 (25 Aug 2019 08:08), Max: 36.7 (24 Aug 2019 15:01)  T(F): 97.9 (25 Aug 2019 08:08), Max: 98.1 (24 Aug 2019 15:01)  HR: 53 (25 Aug 2019 08:08) (53 - 64)  BP: 116/50 (25 Aug 2019 08:08) (111/46 - 119/63)  BP(mean): --  RR: 16 (25 Aug 2019 08:08) (14 - 16)  SpO2: 96% (25 Aug 2019 08:08) (96% - 98%)  Daily     Daily I&O's Summary    24 Aug 2019 07:01  -  25 Aug 2019 07:00  --------------------------------------------------------  IN: 750 mL / OUT: 0 mL / NET: 750 mL        GENERAL:  Appears stated age, well-groomed, well-nourished, no distress  HEENT:  NC/AT,  conjunctivae clear and pink, no thyromegaly, nodules, adenopathy, no JVD, sclera -anicteric  CHEST:  Full & symmetric excursion, no increased effort, breath sounds clear  HEART:  Regular rhythm, S1, S2, no murmur/rub/S3/S4, no abdominal bruit, no edema  ABDOMEN:  Soft, non-tender, non-distended, normoactive bowel sounds,  no masses ,no hepato-splenomegaly, no signs of chronic liver disease  EXTEREMITIES:  no cyanosis,clubbing or edema  SKIN:  No rash/erythema/ecchymoses/petechiae/wounds/abscess/warm/dry  NEURO:  Alert, oriented, no asterixis, no tremor, no encephalopathy      LABS:                        11.5   6.30  )-----------( 333      ( 25 Aug 2019 06:55 )             34.8         142  |  109<H>  |  19  ----------------------------<  97  4.5   |  26  |  1.07    Ca    9.1      25 Aug 2019 06:55    TPro  6.7  /  Alb  3.1<L>  /  TBili  0.3  /  DBili  x   /  AST  181<H>  /  ALT  340<H>  /  AlkPhos  135<H>        Urinalysis Basic - ( 23 Aug 2019 20:37 )    Color: Yellow / Appearance: Clear / S.010 / pH: x  Gluc: x / Ketone: Negative  / Bili: Negative / Urobili: Negative mg/dL   Blood: x / Protein: Negative mg/dL / Nitrite: Negative   Leuk Esterase: Negative / RBC: x / WBC x   Sq Epi: x / Non Sq Epi: x / Bacteria: x      amylaseAmylase, Serum Total: 66 U/L ( @ 06:55)  Amylase, Serum Total: 68 U/L ( @ 06:46)  Amylase, Serum Total: 72 U/L ( @ 18:28)     lipaseLipase, Serum: 92 U/L ( @ 06:55)  Lipase, Serum: 86 U/L ( @ 06:46)  Lipase, Serum: 100 U/L ( @ 18:28)    RADIOLOGY & ADDITIONAL TESTS:
INTERVAL HPI/OVERNIGHT EVENTS:  c/o some incisional pain    Allergies    No Known Allergies    Intolerances    General:  No wt loss, fevers, chills, night sweats, fatigue,   Eyes:  Good vision, no reported pain  ENT:  No sore throat, pain, runny nose, dysphagia  CV:  No pain, palpitations, hypo/hypertension  Resp:  No dyspnea, cough, tachypnea, wheezing  GI:  No pain, No nausea, No vomiting, No diarrhea, No constipation, No weight loss, No fever, No pruritis, No rectal bleeding, No tarry stools, No dysphagia,  :  No pain, bleeding, incontinence, nocturia  Muscle:  No pain, weakness  Neuro:  No weakness, tingling, memory problems  Psych:  No fatigue, insomnia, mood problems, depression  Endocrine:  No polyuria, polydipsia, cold/heat intolerance  Heme:  No petechiae, ecchymosis, easy bruisability  Skin:  No rash, tattoos, scars, edema      PHYSICAL EXAM:   Vital Signs:  Vital Signs Last 24 Hrs  T(C): 36.7 (28 Aug 2019 07:45), Max: 37.2 (27 Aug 2019 23:29)  T(F): 98 (28 Aug 2019 07:45), Max: 98.9 (27 Aug 2019 23:29)  HR: 69 (28 Aug 2019 07:45) (63 - 90)  BP: 123/63 (28 Aug 2019 07:45) (107/73 - 141/67)  BP(mean): --  RR: 16 (28 Aug 2019 07:45) (10 - 20)  SpO2: 100% (28 Aug 2019 07:45) (93% - 100%)  Daily     Daily I&O's Summary    27 Aug 2019 07:01  -  28 Aug 2019 07:00  --------------------------------------------------------  IN: 2155 mL / OUT: 5 mL / NET: 2150 mL        GENERAL:  Appears stated age, well-groomed, well-nourished, no distress  HEENT:  NC/AT,  conjunctivae clear and pink, no thyromegaly, nodules, adenopathy, no JVD, sclera -anicteric  CHEST:  Full & symmetric excursion, no increased effort, breath sounds clear  HEART:  Regular rhythm, S1, S2, no murmur/rub/S3/S4, no abdominal bruit, no edema  ABDOMEN:  Soft, non-tender, non-distended, normoactive bowel sounds,  no masses ,no hepato-splenomegaly, no signs of chronic liver disease  EXTEREMITIES:  no cyanosis,clubbing or edema  SKIN:  No rash/erythema/ecchymoses/petechiae/wounds/abscess/warm/dry  NEURO:  Alert, oriented, no asterixis, no tremor, no encephalopathy      LABS:                        9.5    10.51 )-----------( 262      ( 28 Aug 2019 07:37 )             28.3     08-27    138  |  102  |  16  ----------------------------<  235<H>  3.7   |  23  |  1.43<H>    Ca    8.4      27 Aug 2019 18:51          amylaseAmylase, Serum Total: 66 U/L (08-25 @ 06:55)  Amylase, Serum Total: 68 U/L (08-24 @ 06:46)  Amylase, Serum Total: 72 U/L (08-23 @ 18:28)     lipaseLipase, Serum: 92 U/L (08-25 @ 06:55)  Lipase, Serum: 86 U/L (08-24 @ 06:46)  Lipase, Serum: 100 U/L (08-23 @ 18:28)    RADIOLOGY & ADDITIONAL TESTS:
INTERVAL HPI/OVERNIGHT EVENTS:  s/p  lap madalyn no cbd stone mrcp neg    Allergies    No Known Allergies    Intolerances          General:  No wt loss, fevers, chills, night sweats, fatigue,   Eyes:  Good vision, no reported pain  ENT:  No sore throat, pain, runny nose, dysphagia  CV:  No pain, palpitations, hypo/hypertension  Resp:  No dyspnea, cough, tachypnea, wheezing  GI:  No pain, No nausea, No vomiting, No diarrhea, No constipation, No weight loss, No fever, No pruritis, No rectal bleeding, No tarry stools, No dysphagia,  :  No pain, bleeding, incontinence, nocturia  Muscle:  No pain, weakness  Neuro:  No weakness, tingling, memory problems  Psych:  No fatigue, insomnia, mood problems, depression  Endocrine:  No polyuria, polydipsia, cold/heat intolerance  Heme:  No petechiae, ecchymosis, easy bruisability  Skin:  No rash, tattoos, scars, edema      PHYSICAL EXAM:   Vital Signs:  Vital Signs Last 24 Hrs  T(C): 36.7 (27 Aug 2019 13:38), Max: 37.1 (27 Aug 2019 04:22)  T(F): 98 (27 Aug 2019 13:38), Max: 98.8 (27 Aug 2019 04:22)  HR: 64 (27 Aug 2019 14:23) (64 - 91)  BP: 133/46 (27 Aug 2019 14:23) (93/54 - 146/82)  BP(mean): --  RR: 15 (27 Aug 2019 14:23) (10 - 20)  SpO2: 96% (27 Aug 2019 14:23) (96% - 100%)  Daily Height in cm: 152.4 (27 Aug 2019 04:22)    Daily I&O's Summary    26 Aug 2019 07:01  -  27 Aug 2019 07:00  --------------------------------------------------------  IN: 750 mL / OUT: 0 mL / NET: 750 mL    27 Aug 2019 07:01  -  27 Aug 2019 14:38  --------------------------------------------------------  IN: 1000 mL / OUT: 5 mL / NET: 995 mL        GENERAL:  Appears stated age, well-groomed, well-nourished, no distress  HEENT:  NC/AT,  conjunctivae clear and pink, no thyromegaly, nodules, adenopathy, no JVD, sclera -anicteric  CHEST:  Full & symmetric excursion, no increased effort, breath sounds clear  HEART:  Regular rhythm, S1, S2, no murmur/rub/S3/S4, no abdominal bruit, no edema  ABDOMEN:  Soft, non-tender, non-distended, normoactive bowel sounds,  no masses ,no hepato-splenomegaly, no signs of chronic liver disease  EXTEREMITIES:  no cyanosis,clubbing or edema  SKIN:  No rash/erythema/ecchymoses/petechiae/wounds/abscess/warm/dry  NEURO:  Alert, oriented, no asterixis, no tremor, no encephalopathy      LABS:                        10.9   5.54  )-----------( 315      ( 26 Aug 2019 08:04 )             32.6     08-26    140  |  108  |  17  ----------------------------<  104<H>  4.4   |  28  |  1.11    Ca    8.8      26 Aug 2019 08:04    TPro  6.5  /  Alb  2.9<L>  /  TBili  0.3  /  DBili  x   /  AST  232<H>  /  ALT  365<H>  /  AlkPhos  124<H>  08-26    PT/INR - ( 26 Aug 2019 08:04 )   PT: 12.0 sec;   INR: 1.10 ratio             amylaseAmylase, Serum Total: 66 U/L (08-25 @ 06:55)  Amylase, Serum Total: 68 U/L (08-24 @ 06:46)  Amylase, Serum Total: 72 U/L (08-23 @ 18:28)     lipaseLipase, Serum: 92 U/L (08-25 @ 06:55)  Lipase, Serum: 86 U/L (08-24 @ 06:46)  Lipase, Serum: 100 U/L (08-23 @ 18:28)    RADIOLOGY & ADDITIONAL TESTS:
Subjective: PT OOB, without new complaints.    Objective:  Vital Signs Last 24 Hrs  T(C): 36.7 (26 Aug 2019 07:35), Max: 36.9 (25 Aug 2019 23:29)  T(F): 98 (26 Aug 2019 07:35), Max: 98.4 (25 Aug 2019 23:29)  HR: 63 (26 Aug 2019 07:35) (63 - 71)  BP: 136/74 (26 Aug 2019 07:35) (106/58 - 136/74)  BP(mean): --  RR: 16 (26 Aug 2019 07:35) (15 - 16)  SpO2: 96% (26 Aug 2019 07:35) (96% - 98%)    Heent: DENNIS, FREOM  Pul: clear  Cor: RSR, without murmurs  Abdomen: normal bowel sounds, without distension    with RUQ tenderness without guarding or rebound tenderness  Extremities: without edema, motor/sensory intact, without calf pain, Homans sign negative.                        10.9   5.54  )-----------( 315      ( 26 Aug 2019 08:04 )             32.6       08-26    140  |  108  |  17  ----------------------------<  104<H>  4.4   |  28  |  1.11    Ca    8.8      26 Aug 2019 08:04    TPro  6.5  /  Alb  2.9<L>  /  TBili  0.3  /  DBili  x   /  AST  232<H>  /  ALT  365<H>  /  AlkPhos  124<H>  08-26 08-25 @ 07:01  -  08-26 @ 07:00  --------------------------------------------------------  IN:    lactated ringers.: 450 mL  Total IN: 450 mL    OUT:  Total OUT: 0 mL    Total NET: 450 mL
Subjective: Pt without new complaints.    Objective:  Vital Signs Last 24 Hrs  T(C): 36.8 (24 Aug 2019 08:08), Max: 37.1 (23 Aug 2019 17:28)  T(F): 98.2 (24 Aug 2019 08:08), Max: 98.8 (23 Aug 2019 17:28)  HR: 59 (24 Aug 2019 08:08) (59 - 76)  BP: 117/71 (24 Aug 2019 08:08) (111/54 - 160/70)  BP(mean): --  RR: 16 (24 Aug 2019 08:08) (16 - 16)  SpO2: 98% (24 Aug 2019 08:08) (96% - 99%)    Heent: DENNIS, FREOM  Pul: clear  Cor: RSR, without murmurs  Abdomen: normal bowel sounds, without distension with RUQ tenderness  Extremities: without edema, motor/sensory intact, without calf pain, Homans sign negative.                        10.9   6.31  )-----------( 295      ( 24 Aug 2019 06:46 )             32.9       08-24    143  |  111<H>  |  25<H>  ----------------------------<  87  4.5   |  26  |  1.03    Ca    8.6      24 Aug 2019 06:46    TPro  6.1  /  Alb  2.8<L>  /  TBili  0.3  /  DBili  x   /  AST  179<H>  /  ALT  338<H>  /  AlkPhos  129<H>  08-24
Subjective: pt OOB, without complaints.    Objective:  Vital Signs Last 24 Hrs  T(C): 36.6 (25 Aug 2019 08:08), Max: 36.7 (24 Aug 2019 15:01)  T(F): 97.9 (25 Aug 2019 08:08), Max: 98.1 (24 Aug 2019 15:01)  HR: 53 (25 Aug 2019 08:08) (53 - 64)  BP: 116/50 (25 Aug 2019 08:08) (111/46 - 119/63)  BP(mean): --  RR: 16 (25 Aug 2019 08:08) (14 - 16)  SpO2: 96% (25 Aug 2019 08:08) (96% - 98%)    Heent: BASIM COLLINS  Pul: clear  Cor: RSR, without murmurs  Abdomen: normal bowel sounds, without distension or tenderness  Extremities: without edema, motor/sensory intact, without calf pain, Homans sign negative.                        11.5   6.30  )-----------( 333      ( 25 Aug 2019 06:55 )             34.8       08-25    142  |  109<H>  |  19  ----------------------------<  97  4.5   |  26  |  1.07    Ca    9.1      25 Aug 2019 06:55    TPro  6.7  /  Alb  3.1<L>  /  TBili  0.3  /  DBili  x   /  AST  181<H>  /  ALT  340<H>  /  AlkPhos  135<H>  08-25 08-24 @ 07:01  -  08-25 @ 07:00  --------------------------------------------------------  IN:    IV PiggyBack: 200 mL    lactated ringers.: 550 mL  Total IN: 750 mL    OUT:  Total OUT: 0 mL    Total NET: 750 mL
pt seen  c/o pain  ICU Vital Signs Last 24 Hrs  T(C): 36.7 (28 Aug 2019 07:45), Max: 37.2 (27 Aug 2019 23:29)  T(F): 98 (28 Aug 2019 07:45), Max: 98.9 (27 Aug 2019 23:29)  HR: 69 (28 Aug 2019 07:45) (63 - 90)  BP: 123/63 (28 Aug 2019 07:45) (107/73 - 141/67)  BP(mean): --  ABP: --  ABP(mean): --  RR: 16 (28 Aug 2019 07:45) (10 - 20)  SpO2: 100% (28 Aug 2019 07:45) (93% - 100%)  gen-NAD  resp-clear  abd-soft NtND  incision c/d/i                          9.5    10.51 )-----------( 262      ( 28 Aug 2019 07:37 )             28.3

## 2019-08-28 NOTE — PROGRESS NOTE ADULT - ASSESSMENT
The patient is a 74 year old female with a history of HTN, gastritis who presented with abnormal LFTs, found to have cholecystitis.    Plan:  - ECG with RBBB, likely normal variant. No evidence of ischemia or infarction.  - Continue lisinopril 40 mg daily  - MRCP today  - Plan for cholecystectomy. There are no active cardiac issues and the patient is asymptomatic from a cardiac perspective. She is at low/intermediate risk for cardiac events for an intermediate risk surgery. She is optimized to proceed without additional cardiac testing.
ruq pain  inc lfts    plan  mrcp in am   serial labs   pain management  to follow
IMPRESSION awaiting MRCP  PLAN if MRCP shows no CBD stones will do lap madalyn
IMPRESSION pt doing well  PLAN for MRCP
PLAN for MRCP- if CBD is clear than lap madalyn
[facility] pain  inc lfts    plan  d/c planning   serial labs   pain management  to follow
The patient is a 74 year old female with a history of HTN, gastritis who presented with abnormal LFTs, found to have cholecystitis.    Plan:  - Continue lisinopril 40 mg daily  - Incentive spirometry  - Surgery follow-up  - Pain control  - Discharge planning
The patient is a 74 year old female with a history of HTN, gastritis who presented with abnormal LFTs, found to have cholecystitis.    Plan:  - ECG with RBBB, likely normal variant. No evidence of ischemia or infarction.  - Continue lisinopril 40 mg daily  - Awaiting MRCP  - Plan for cholecystectomy. There are no active cardiac issues and the patient is asymptomatic from a cardiac perspective. She is at low/intermediate risk for cardiac events for an intermediate risk surgery. She is optimized to proceed without additional cardiac testing.
The patient is a 74 year old female with a history of HTN, gastritis who presented with abnormal LFTs, found to have cholecystitis.    Plan:  - ECG with RBBB, likely normal variant. No evidence of ischemia or infarction.  - Continue lisinopril 40 mg daily  - MRCP tomorrow  - Plan for cholecystectomy. There are no active cardiac issues and the patient is asymptomatic from a cardiac perspective. She is at low/intermediate risk for cardiac events for an intermediate risk surgery. She is optimized to proceed without additional cardiac testing.
ruq pain  inc lfts    plan  ? cbd stone  mrcp in am   serial labs   pain management  to follow
ruq pain  inc lfts    plan  mrcp neg or today  serial labs   pain management  to follow
s/p lap madalyn  d/c home

## 2019-08-28 NOTE — DISCHARGE NOTE PROVIDER - NSDCCPCAREPLAN_GEN_ALL_CORE_FT
PRINCIPAL DISCHARGE DIAGNOSIS  Diagnosis: Gallstone  Assessment and Plan of Treatment:       SECONDARY DISCHARGE DIAGNOSES  Diagnosis: Elevated LFTs  Assessment and Plan of Treatment:

## 2019-08-29 LAB
CULTURE RESULTS: SIGNIFICANT CHANGE UP
CULTURE RESULTS: SIGNIFICANT CHANGE UP
SPECIMEN SOURCE: SIGNIFICANT CHANGE UP
SPECIMEN SOURCE: SIGNIFICANT CHANGE UP

## 2019-09-19 PROCEDURE — 87040 BLOOD CULTURE FOR BACTERIA: CPT

## 2019-09-19 PROCEDURE — 86900 BLOOD TYPING SEROLOGIC ABO: CPT

## 2019-09-19 PROCEDURE — 80053 COMPREHEN METABOLIC PANEL: CPT

## 2019-09-19 PROCEDURE — 83605 ASSAY OF LACTIC ACID: CPT

## 2019-09-19 PROCEDURE — 83690 ASSAY OF LIPASE: CPT

## 2019-09-19 PROCEDURE — 99285 EMERGENCY DEPT VISIT HI MDM: CPT | Mod: 25

## 2019-09-19 PROCEDURE — 85610 PROTHROMBIN TIME: CPT

## 2019-09-19 PROCEDURE — 80048 BASIC METABOLIC PNL TOTAL CA: CPT

## 2019-09-19 PROCEDURE — 86850 RBC ANTIBODY SCREEN: CPT

## 2019-09-19 PROCEDURE — 86803 HEPATITIS C AB TEST: CPT

## 2019-09-19 PROCEDURE — 88304 TISSUE EXAM BY PATHOLOGIST: CPT

## 2019-09-19 PROCEDURE — 86901 BLOOD TYPING SEROLOGIC RH(D): CPT

## 2019-09-19 PROCEDURE — 82150 ASSAY OF AMYLASE: CPT

## 2019-09-19 PROCEDURE — 93005 ELECTROCARDIOGRAM TRACING: CPT

## 2019-09-19 PROCEDURE — 76700 US EXAM ABDOM COMPLETE: CPT

## 2019-09-19 PROCEDURE — 71046 X-RAY EXAM CHEST 2 VIEWS: CPT

## 2019-09-19 PROCEDURE — 85027 COMPLETE CBC AUTOMATED: CPT

## 2019-09-19 PROCEDURE — 81003 URINALYSIS AUTO W/O SCOPE: CPT

## 2019-09-19 PROCEDURE — C1889: CPT

## 2019-09-19 PROCEDURE — 36415 COLL VENOUS BLD VENIPUNCTURE: CPT

## 2020-10-09 NOTE — ED PROVIDER NOTE - CARDIOVASCULAR NEGATIVE STATEMENT, MLM
Mildly to Moderately Impaired: difficulty hearing in some environments or speaker may need to increase volume or speak distinctly no chest pain and no edema.

## 2022-03-10 NOTE — DISCHARGE NOTE PROVIDER - CARE PROVIDER_API CALL
no
Moiz Polo)  Surgery  [address] [facility], [address] [place] [address]  Phone: [phone]  Fax: [phone]  Follow Up Time:

## 2022-08-02 ENCOUNTER — APPOINTMENT (OUTPATIENT)
Dept: ANTEPARTUM | Facility: CLINIC | Age: 77
End: 2022-08-02

## 2022-08-09 ENCOUNTER — ASOB RESULT (OUTPATIENT)
Age: 77
End: 2022-08-09

## 2022-08-09 ENCOUNTER — APPOINTMENT (OUTPATIENT)
Dept: ANTEPARTUM | Facility: CLINIC | Age: 77
End: 2022-08-09

## 2022-08-09 PROCEDURE — 76830 TRANSVAGINAL US NON-OB: CPT

## 2022-08-09 PROCEDURE — 76856 US EXAM PELVIC COMPLETE: CPT | Mod: 59

## 2023-01-22 NOTE — PATIENT PROFILE ADULT - PSYCHOSOCIAL CONCERNS
89M presenting 12h after noting that PEG tube is clogged, has been unable to give feeds or fluids since this morning. Denies any fever, abdominal pain. Peg was placed june 2022.
none

## 2024-02-06 ENCOUNTER — EMERGENCY (EMERGENCY)
Facility: HOSPITAL | Age: 79
LOS: 1 days | Discharge: HOME | End: 2024-02-06
Attending: EMERGENCY MEDICINE | Admitting: EMERGENCY MEDICINE
Payer: MEDICARE

## 2024-02-06 VITALS
SYSTOLIC BLOOD PRESSURE: 158 MMHG | OXYGEN SATURATION: 97 % | TEMPERATURE: 98 F | HEIGHT: 60 IN | HEART RATE: 70 BPM | DIASTOLIC BLOOD PRESSURE: 76 MMHG | WEIGHT: 119.05 LBS | RESPIRATION RATE: 18 BRPM

## 2024-02-06 VITALS
OXYGEN SATURATION: 98 % | TEMPERATURE: 98 F | DIASTOLIC BLOOD PRESSURE: 55 MMHG | SYSTOLIC BLOOD PRESSURE: 130 MMHG | RESPIRATION RATE: 18 BRPM | HEART RATE: 68 BPM

## 2024-02-06 LAB
ALBUMIN SERPL ELPH-MCNC: 3.8 G/DL — SIGNIFICANT CHANGE UP (ref 3.3–5)
ALP SERPL-CCNC: 73 U/L — SIGNIFICANT CHANGE UP (ref 30–120)
ALT FLD-CCNC: 16 U/L — SIGNIFICANT CHANGE UP (ref 10–60)
ANION GAP SERPL CALC-SCNC: 11 MMOL/L — SIGNIFICANT CHANGE UP (ref 5–17)
AST SERPL-CCNC: 20 U/L — SIGNIFICANT CHANGE UP (ref 10–40)
BASOPHILS # BLD AUTO: 0.06 K/UL — SIGNIFICANT CHANGE UP (ref 0–0.2)
BASOPHILS NFR BLD AUTO: 0.8 % — SIGNIFICANT CHANGE UP (ref 0–2)
BILIRUB SERPL-MCNC: 0.4 MG/DL — SIGNIFICANT CHANGE UP (ref 0.2–1.2)
BUN SERPL-MCNC: 23 MG/DL — SIGNIFICANT CHANGE UP (ref 7–23)
CALCIUM SERPL-MCNC: 9.4 MG/DL — SIGNIFICANT CHANGE UP (ref 8.4–10.5)
CHLORIDE SERPL-SCNC: 104 MMOL/L — SIGNIFICANT CHANGE UP (ref 96–108)
CO2 SERPL-SCNC: 26 MMOL/L — SIGNIFICANT CHANGE UP (ref 22–31)
CREAT SERPL-MCNC: 0.97 MG/DL — SIGNIFICANT CHANGE UP (ref 0.5–1.3)
EGFR: 60 ML/MIN/1.73M2 — SIGNIFICANT CHANGE UP
EOSINOPHIL # BLD AUTO: 0.18 K/UL — SIGNIFICANT CHANGE UP (ref 0–0.5)
EOSINOPHIL NFR BLD AUTO: 2.4 % — SIGNIFICANT CHANGE UP (ref 0–6)
GLUCOSE SERPL-MCNC: 113 MG/DL — HIGH (ref 70–99)
HCT VFR BLD CALC: 37 % — SIGNIFICANT CHANGE UP (ref 34.5–45)
HGB BLD-MCNC: 12.6 G/DL — SIGNIFICANT CHANGE UP (ref 11.5–15.5)
IMM GRANULOCYTES NFR BLD AUTO: 0.3 % — SIGNIFICANT CHANGE UP (ref 0–0.9)
LYMPHOCYTES # BLD AUTO: 2.59 K/UL — SIGNIFICANT CHANGE UP (ref 1–3.3)
LYMPHOCYTES # BLD AUTO: 34.4 % — SIGNIFICANT CHANGE UP (ref 13–44)
MCHC RBC-ENTMCNC: 29.4 PG — SIGNIFICANT CHANGE UP (ref 27–34)
MCHC RBC-ENTMCNC: 34.1 GM/DL — SIGNIFICANT CHANGE UP (ref 32–36)
MCV RBC AUTO: 86.4 FL — SIGNIFICANT CHANGE UP (ref 80–100)
MONOCYTES # BLD AUTO: 0.66 K/UL — SIGNIFICANT CHANGE UP (ref 0–0.9)
MONOCYTES NFR BLD AUTO: 8.8 % — SIGNIFICANT CHANGE UP (ref 2–14)
NEUTROPHILS # BLD AUTO: 4.03 K/UL — SIGNIFICANT CHANGE UP (ref 1.8–7.4)
NEUTROPHILS NFR BLD AUTO: 53.3 % — SIGNIFICANT CHANGE UP (ref 43–77)
NRBC # BLD: 0 /100 WBCS — SIGNIFICANT CHANGE UP (ref 0–0)
PLATELET # BLD AUTO: 260 K/UL — SIGNIFICANT CHANGE UP (ref 150–400)
POTASSIUM SERPL-MCNC: 4.1 MMOL/L — SIGNIFICANT CHANGE UP (ref 3.5–5.3)
POTASSIUM SERPL-SCNC: 4.1 MMOL/L — SIGNIFICANT CHANGE UP (ref 3.5–5.3)
PROT SERPL-MCNC: 7.8 G/DL — SIGNIFICANT CHANGE UP (ref 6–8.3)
RBC # BLD: 4.28 M/UL — SIGNIFICANT CHANGE UP (ref 3.8–5.2)
RBC # FLD: 13 % — SIGNIFICANT CHANGE UP (ref 10.3–14.5)
SODIUM SERPL-SCNC: 141 MMOL/L — SIGNIFICANT CHANGE UP (ref 135–145)
TROPONIN I, HIGH SENSITIVITY RESULT: 11.6 NG/L — SIGNIFICANT CHANGE UP
WBC # BLD: 7.54 K/UL — SIGNIFICANT CHANGE UP (ref 3.8–10.5)
WBC # FLD AUTO: 7.54 K/UL — SIGNIFICANT CHANGE UP (ref 3.8–10.5)

## 2024-02-06 PROCEDURE — 99285 EMERGENCY DEPT VISIT HI MDM: CPT | Mod: 25

## 2024-02-06 PROCEDURE — 70450 CT HEAD/BRAIN W/O DYE: CPT | Mod: MA

## 2024-02-06 PROCEDURE — 99285 EMERGENCY DEPT VISIT HI MDM: CPT

## 2024-02-06 PROCEDURE — 71045 X-RAY EXAM CHEST 1 VIEW: CPT | Mod: 26

## 2024-02-06 PROCEDURE — 72125 CT NECK SPINE W/O DYE: CPT | Mod: MA

## 2024-02-06 PROCEDURE — 70450 CT HEAD/BRAIN W/O DYE: CPT | Mod: 26,MA

## 2024-02-06 PROCEDURE — 72125 CT NECK SPINE W/O DYE: CPT | Mod: 26,MA

## 2024-02-06 PROCEDURE — 93005 ELECTROCARDIOGRAM TRACING: CPT

## 2024-02-06 PROCEDURE — 84484 ASSAY OF TROPONIN QUANT: CPT

## 2024-02-06 PROCEDURE — 71045 X-RAY EXAM CHEST 1 VIEW: CPT

## 2024-02-06 PROCEDURE — 80053 COMPREHEN METABOLIC PANEL: CPT

## 2024-02-06 PROCEDURE — 96365 THER/PROPH/DIAG IV INF INIT: CPT

## 2024-02-06 PROCEDURE — 36415 COLL VENOUS BLD VENIPUNCTURE: CPT

## 2024-02-06 PROCEDURE — 85025 COMPLETE CBC W/AUTO DIFF WBC: CPT

## 2024-02-06 PROCEDURE — 93010 ELECTROCARDIOGRAM REPORT: CPT

## 2024-02-06 PROCEDURE — 73030 X-RAY EXAM OF SHOULDER: CPT

## 2024-02-06 PROCEDURE — 73030 X-RAY EXAM OF SHOULDER: CPT | Mod: 26,RT

## 2024-02-06 RX ORDER — AMLODIPINE BESYLATE 2.5 MG/1
1 TABLET ORAL
Refills: 0 | DISCHARGE

## 2024-02-06 RX ORDER — SODIUM CHLORIDE 9 MG/ML
1000 INJECTION INTRAMUSCULAR; INTRAVENOUS; SUBCUTANEOUS ONCE
Refills: 0 | Status: COMPLETED | OUTPATIENT
Start: 2024-02-06 | End: 2024-02-06

## 2024-02-06 RX ORDER — CHLORTHALIDONE 50 MG
1 TABLET ORAL
Qty: 0 | Refills: 0 | DISCHARGE

## 2024-02-06 RX ORDER — LOSARTAN POTASSIUM 100 MG/1
1 TABLET, FILM COATED ORAL
Refills: 0 | DISCHARGE

## 2024-02-06 RX ORDER — GABAPENTIN 400 MG/1
0 CAPSULE ORAL
Refills: 0 | DISCHARGE

## 2024-02-06 RX ORDER — LISINOPRIL 2.5 MG/1
1 TABLET ORAL
Qty: 0 | Refills: 0 | DISCHARGE

## 2024-02-06 RX ORDER — ACETAMINOPHEN 500 MG
1000 TABLET ORAL ONCE
Refills: 0 | Status: COMPLETED | OUTPATIENT
Start: 2024-02-06 | End: 2024-02-06

## 2024-02-06 RX ADMIN — Medication 400 MILLIGRAM(S): at 10:11

## 2024-02-06 RX ADMIN — SODIUM CHLORIDE 1000 MILLILITER(S): 9 INJECTION INTRAMUSCULAR; INTRAVENOUS; SUBCUTANEOUS at 10:12

## 2024-02-06 RX ADMIN — SODIUM CHLORIDE 1000 MILLILITER(S): 9 INJECTION INTRAMUSCULAR; INTRAVENOUS; SUBCUTANEOUS at 11:18

## 2024-02-06 RX ADMIN — Medication 1000 MILLIGRAM(S): at 11:00

## 2024-02-06 NOTE — ED PROVIDER NOTE - CARE PROVIDER_API CALL
Morgan Roe  Internal Medicine  [address] [facility], [address]  [place] [address]  Phone: [phone]  Fax: [phone]  Follow Up Time: 1-3 Days    Palla, Venugopal Reddy  Cardiovascular Disease  [address] [facility], [address]  [place] [address]  Phone: [phone]  Fax: [phone]  Follow Up Time: 1-3 Days    Aries Shelton  Orthopaedic Surgery  [address] [facility] [address]  [place] [address]  Phone: [phone]  Fax: [phone]  Follow Up Time: 1-3 Days

## 2024-02-06 NOTE — ED PROVIDER NOTE - DIFFERENTIAL DIAGNOSIS
Differential including but not limited to ICH fracture MI syncope electrolyte abnormality dehydration anemia Differential Diagnosis

## 2024-02-06 NOTE — ED PROVIDER NOTE - PROGRESS NOTE DETAILS
132.9
Reevaluated patient at bedside.  Patient feeling improved, ambulated in er, declining to stay for cardiology consult, wants to be d/c home and f/u as outpt.  Discussed the results of all diagnostic testing in ED and copies of all reports given.   An opportunity to ask questions was given.  Discussed the importance of prompt, close medical follow-up.  Patient will return with any changes, concerns or persistent / worsening symptoms.  Understanding of all instructions verbalized.

## 2024-02-06 NOTE — ED PROVIDER NOTE - CARE PROVIDERS DIRECT ADDRESSES
[email or web address]

## 2024-02-06 NOTE — ED PROVIDER NOTE - CLINICAL SUMMARY MEDICAL DECISION MAKING FREE TEXT BOX
Family  per patient request.  Patient complaining of right shoulder pain status post fall yesterday while walking on the sidewalk.  Patient relates she is unsure how she fell as the sidewalk was not broken or uneven and she thinks she may have passed out.  Patient reports feeling lightheaded for a few minutes after hitting her head on the ground patient no longer dizzy.  No chest pain short of breath neck or back pain leg pain weakness numbness abdominal pain.  PMD Linker    Plan EKG CT head and C-spine chest x-ray right shoulder x-ray IV fluids Ofirmev    Differential including but not limited to ICH fracture MI syncope electrolyte abnormality dehydration anemia

## 2024-02-06 NOTE — ED PROVIDER NOTE - OBJECTIVE STATEMENT
Family  per patient request.  Patient complaining of right shoulder pain status post fall yesterday while walking on the sidewalk.  Patient relates she is unsure how she fell as the sidewalk was not broken or uneven and she thinks she may have passed out.  Patient reports feeling lightheaded for a few minutes after hitting her head on the ground patient no longer dizzy.  No chest pain short of breath neck or back pain leg pain weakness numbness abdominal pain.  PMD Linker

## 2024-02-06 NOTE — ED ADULT NURSE NOTE - NSFALLRISKINTERV_ED_ALL_ED

## 2024-02-06 NOTE — ED ADULT NURSE NOTE - OBJECTIVE STATEMENT
Pt came in ambulatory accompanied by her daughter for pain right arm associated with a fall incident yesterday. Pt reported was walking and fell on a sidewalk - Pt complains of right arm pain  - Pt presented with small abrasion on the forehead > No LOC No blood thinners Pt speaks Maced[place] daughter at bedside to translate

## 2024-02-06 NOTE — ED ADULT TRIAGE NOTE - CHIEF COMPLAINT QUOTE
" She fell yesterday on the sidewalk  ,  she is on pain today " Pt complains of right arm pain No blood thinners

## 2024-02-06 NOTE — ED PROVIDER NOTE - PROVIDER TOKENS
PROVIDER:[TOKEN:[[id number]],FOLLOWUP:[1-3 Days]],PROVIDER:[TOKEN:[[id number]],FOLLOWUP:[1-3 Days]],PROVIDER:[TOKEN:[[id number]],FOLLOWUP:[1-3 Days]]

## 2024-02-06 NOTE — ED ADULT NURSE NOTE - PAIN: PRECIPITATING/AGGRAVATING FACTORS
Reason For Consult  Patient here for multiple orthopedic injuries    History Of Present Illness  Rita Baez is a 89 y.o. female presenting with history of recurrent falls most recently yesterday in which she struck her face.  Because of diffuse bruising and multiple extremity complaints evaluation for patient's pelvis low back right knee and left humerus have been requested.  Patient is a good historian for age and describes living alone in an apartment but having difficulty walking recently.  It is somewhat variable.  Feels that her left leg is weak or sore as a source of recurrent falls.  She describes a fall a week ago and because she had to drag herself across a carpeted floor ended up with a rug burn on her right knee.  No other symptoms there patient has no complaints of pelvic area pain at rest.  Unsure but may have some soreness left side when she ambulates.  She relates that fall she had a few months ago documented by x-rays at [facility] [place] operated by [facility] where she fractured her left superior and inferior pubic rami.  5 years ago she relates that she heard something in her low back pelvic area.  Greatest complaints now related to the left side of her face..  No current complaints related to her left upper arm.     Past Medical History  She has no past medical history on file.  Pertinent history as above    Surgical History  She has no past surgical history on file.     Social History  She has no history on file for tobacco use, alcohol use, and drug use.    Family History  No family history on file.     Allergies  Patient has no allergy information on record.    Review of Systems       Physical Exam  Alert and oriented.  Diffuse ecchymosis most notable face but also left-sided hip and pelvis left arm and abrasion of the right knee she is clean and dry.  Patient has tenderness on palpation over the left greater trochanter in the area of an ecchymotic region.  Motion is generally painless left hip but somewhat restricted in  "internal rotation right hip motion is good compression iliac crest negative palpation of the left groin and over the sacrum and SI joints unremarkable as is lumbar spine.  Range of motion left shoulder mildly restricted with fairly good strength.  Left elbow exam unremarkable.  Upper arm unremarkable.  Right knee has documented abrasion motion stability of the knee is good and painless.  With assistance patient was able to walk full weightbearing across room and into the bathroom.     Last Recorded Vitals  Blood pressure 141/86, pulse 91, temperature 37.6 °C (99.7 °F), resp. rate 16, height 1.499 m (4' 11\"), weight 54.4 kg (120 lb), SpO2 96 %.    Relevant Results    Reviewed imaging from [facility] August 23 confirming the comminuted left superior pubic rami and left inferior pubic rami fractures.  Current imaging includes pelvis left hip which again shows a comminuted left superior ramus fracture which has not healed.  Do not see any evidence of acute injury.  CT scan which includes level lower pelvis abdomen documents an L3 wedge anterior onto the left side which appears chronic.  In addition again documents the comminuted nonhealed left superior ramus fracture, inferior pubic ramus fracture and an angulated fracture on the sagittal view of about S2-3.  Left humerus shows no acute abnormality with high riding humeral head.  Right knee x-ray AP and lateral shows mild medial compartment narrowing but no acute abnormality.  Reports cervical spine CT shows no fractures.    Scheduled medications     Continuous medications  sodium chloride 0.9%, 75 mL/hr, Last Rate: 75 mL/hr (12/12/23 0546)      PRN medications  PRN medications: acetaminophen, ondansetron, oxyCODONE, oxyCODONE, oxygen  Results for orders placed or performed during the hospital encounter of 12/11/23 (from the past 24 hour(s))   CBC and Auto Differential   Result Value Ref Range    WBC 40.2 (H) 4.4 - 11.3 x10*3/uL    nRBC 0.0 0.0 - 0.0 /100 WBCs    RBC 3.44 " (L) 4.00 - 5.20 x10*6/uL    Hemoglobin 10.4 (L) 12.0 - 16.0 g/dL    Hematocrit 30.5 (L) 36.0 - 46.0 %    MCV 89 80 - 100 fL    MCH 30.2 26.0 - 34.0 pg    MCHC 34.1 32.0 - 36.0 g/dL    RDW 16.4 (H) 11.5 - 14.5 %    Platelets 242 150 - 450 x10*3/uL    Immature Granulocytes %, Automated 0.6 0.0 - 0.9 %    Immature Granulocytes Absolute, Automated 0.26 0.00 - 0.50 x10*3/uL   Comprehensive Metabolic Panel   Result Value Ref Range    Glucose 130 (H) 74 - 99 mg/dL    Sodium 125 (L) 136 - 145 mmol/L    Potassium 4.1 3.5 - 5.3 mmol/L    Chloride 90 (L) 98 - 107 mmol/L    Bicarbonate 28 21 - 32 mmol/L    Anion Gap 11 10 - 20 mmol/L    Urea Nitrogen 22 6 - 23 mg/dL    Creatinine 1.05 0.50 - 1.05 mg/dL    eGFR 49 (L) >60 mL/min/1.73m*2    Calcium 10.3 8.6 - 10.3 mg/dL    Albumin 3.0 (L) 3.4 - 5.0 g/dL    Alkaline Phosphatase 94 33 - 136 U/L    Total Protein 5.7 (L) 6.4 - 8.2 g/dL    AST 15 9 - 39 U/L    Bilirubin, Total 1.3 (H) 0.0 - 1.2 mg/dL    ALT 9 7 - 45 U/L   Protime-INR   Result Value Ref Range    Protime 15.9 (H) 9.8 - 12.8 seconds    INR 1.4 (H) 0.9 - 1.1   Type And Screen   Result Value Ref Range    ABO TYPE O     Rh TYPE NEG     ANTIBODY SCREEN NEG    Manual Differential   Result Value Ref Range    Neutrophils %, Manual 16.0 40.0 - 80.0 %    Lymphocytes %, Manual 79.0 13.0 - 44.0 %    Monocytes %, Manual 4.0 2.0 - 10.0 %    Eosinophils %, Manual 0.0 0.0 - 6.0 %    Basophils %, Manual 0.0 0.0 - 2.0 %    Myelocytes %, Manual 1.0 0.0 - 0.0 %    Seg Neutrophils Absolute, Manual 6.43 (H) 1.60 - 5.00 x10*3/uL    Lymphocytes Absolute, Manual 31.76 (H) 0.80 - 3.00 x10*3/uL    Monocytes Absolute, Manual 1.61 (H) 0.05 - 0.80 x10*3/uL    Eosinophils Absolute, Manual 0.00 0.00 - 0.40 x10*3/uL    Basophils Absolute, Manual 0.00 0.00 - 0.10 x10*3/uL    Myelocytes Absolute, Manual 0.40 0.00 - 0.00 x10*3/uL    Total Cells Counted 100     RBC Morphology See Below     Polychromasia Mild     Ovalocytes Few     [place] Cells Few      Acanthocytes Few    CBC   Result Value Ref Range    WBC 38.2 (H) 4.4 - 11.3 x10*3/uL    nRBC 0.0 0.0 - 0.0 /100 WBCs    RBC 3.21 (L) 4.00 - 5.20 x10*6/uL    Hemoglobin 9.4 (L) 12.0 - 16.0 g/dL    Hematocrit 28.5 (L) 36.0 - 46.0 %    MCV 89 80 - 100 fL    MCH 29.3 26.0 - 34.0 pg    MCHC 33.0 32.0 - 36.0 g/dL    RDW 16.6 (H) 11.5 - 14.5 %    Platelets 245 150 - 450 x10*3/uL   Basic metabolic panel   Result Value Ref Range    Glucose 131 (H) 74 - 99 mg/dL    Sodium 128 (L) 136 - 145 mmol/L    Potassium 3.7 3.5 - 5.3 mmol/L    Chloride 94 (L) 98 - 107 mmol/L    Bicarbonate 28 21 - 32 mmol/L    Anion Gap 10 10 - 20 mmol/L    Urea Nitrogen 20 6 - 23 mg/dL    Creatinine 0.99 0.50 - 1.05 mg/dL    eGFR 52 (L) >60 mL/min/1.73m*2    Calcium 9.7 8.6 - 10.3 mg/dL        Assessment/Plan     Remote at least 3-month old left superior and inferior pubic rami fractures which are not healed to date.  #2 mild arthritis left hip and right knee #3multiple diffuse ecchymosis and right knee abrasion #4 remote L3 compression fracture #5 debility of age.    Do not see an acute orthopedic abnormality.  Facial fractures appear to be the greatest acute abnormality.  Unclear if this patient is safe to reside alone at home and appropriate social service evaluation will be needed.  With assistance patient appears safe to ambulate full weightbearing on both legs at the present time.  Happy to follow this patient both in hospital and post discharge.  Happy to follow with you and we will try to reach the family.        Gerry Champion MD     activity/movement

## 2024-02-06 NOTE — ED PROVIDER NOTE - PATIENT PORTAL LINK FT
You can access the FollowMyHealth Patient Portal offered by [facility] by registering at the following website: [email or web address]. By joining [facility]’s FollowMyHealth portal, you will also be able to view your health information using other applications (apps) compatible with our system.

## 2024-02-06 NOTE — ED PROVIDER NOTE - NSFOLLOWUPINSTRUCTIONS_ED_ALL_ED_FT
Syncope, Adult  Outline of the head showing blood vessels that supply the brain.  Syncope refers to a condition in which a person temporarily loses consciousness. Syncope may also be called fainting or passing out. It is caused by a sudden decrease in blood flow to the brain. This can happen for a variety of reasons.    Most causes of syncope are not dangerous. It can be triggered by things such as needle sticks, seeing blood, pain, or intense emotion. However, syncope can also be a sign of a serious medical problem, such as a heart abnormality. Other causes can include dehydration, migraines, or taking medicines that lower blood pressure. Your health care provider may do tests to find the reason why you are having syncope.    If you faint, get medical help right away. Call your local emergency services (911 in the [place]    Follow these instructions at home:  Pay attention to any changes in your symptoms. Take these actions to stay safe and to help relieve your symptoms:    Knowing when you may be about to faint    Signs that you may be about to faint include:  Feeling dizzy, weak, light-headed, or like the room is spinning.  Feeling nauseous.  Seeing spots or seeing all white or all black in your field of vision.  Having cold, clammy skin or feeling warm and sweaty.  Hearing ringing in the ears (tinnitus).  If you start to feel like you might faint, sit or lie down right away. If sitting, put your head down between your legs. If lying down, raise (elevate) your feet above the level of your heart.  Breathe deeply and steadily. Wait until all the symptoms have passed.  Have someone stay with you until you feel stable.  Medicines    Take over-the-counter and prescription medicines only as told by your health care provider.  If you are taking blood pressure or heart medicine, get up slowly and take several minutes to sit and then stand. This can reduce dizziness and decrease the risk of syncope.  Lifestyle    Do not drive, use machinery, or play sports until your health care provider says it is okay.  Do not drink alcohol.  Do not use any products that contain nicotine or tobacco. These products include cigarettes, chewing tobacco, and vaping devices, such as e-cigarettes. If you need help quitting, ask your health care provider.  Avoid hot tubs and saunas.  General instructions    Talk with your health care provider about your symptoms. You may need to have testing to understand the cause of your syncope.  Drink enough fluid to keep your urine pale yellow.  Avoid prolonged standing. If you must stand for a long time, do movements such as:  Moving your legs.  Crossing your legs.  Flexing and stretching your leg muscles.  Squatting.  Keep all follow-up visits. This is important.  Contact a health care provider if:  You have episodes of near fainting.  Get help right away if:  You faint.  You hit your head or are injured after fainting.  You have any of these symptoms that may indicate trouble with your heart:  Fast or irregular heartbeats (palpitations).  Unusual pain in your chest, abdomen, or back.  Shortness of breath.  You have a seizure.  You have a severe headache.  You are confused.  You have vision problems.  You have severe weakness or trouble walking.  You are bleeding from your mouth or rectum, or you have black or tarry stool.  These symptoms may represent a serious problem that is an emergency. Do not wait to see if your symptoms will go away. Get medical help right away. Call your local emergency services (911 in the [place] Do not drive yourself to the hospital.    Summary  Syncope refers to a condition in which a person temporarily loses consciousness. Syncope may also be called fainting or passing out. It is caused by a sudden decrease in blood flow to the brain.  Signs that you may be about to faint include dizziness, feeling light-headed, feeling nauseous, sudden vision changes, or cold, clammy skin.  Even though most causes of syncope are not dangerous, syncope can be a sign of a serious medical problem. Get help right away if you faint.  If you start to feel like you might faint, sit or lie down right away. If sitting, put your head down between your legs. If lying down, raise (elevate) your feet above the level of your heart.  This information is not intended to replace advice given to you by your health care provider. Make sure you discuss any questions you have with your health care provider.        SHOULDER SPRAIN - [facility](R) Instructions(ER/ED)    Shoulder Sprain    WHAT YOU NEED TO KNOW:    A shoulder sprain happens when a ligament in your shoulder is stretched or torn. Ligaments are the tough tissues that connect bones. Ligaments allow you to lift, lower, and rotate your arm.  Shoulder Anatomy    DISCHARGE INSTRUCTIONS:    Return to the emergency department if:    You feel severe pain in your shoulder when you move it, or it is touched.    Your skin feels cold or clammy.    You have numbness, tingling, or a feeling of pins and needles in your shoulder.    The skin on your injured shoulder looks blue or pale.  Call your doctor if:    You have new or increased swelling and pain in your shoulder.    You have new or increased stiffness when you move your injured shoulder.    Your symptoms do not improve within 5 to 7 days.    You have questions or concerns about your condition or care.  Medicines: You may need any of the following:    Acetaminophen decreases pain and fever. It is available without a doctor's order. Ask how much to take and how often to take it. Follow directions. Read the labels of all other medicines you are using to see if they also contain acetaminophen, or ask your doctor or pharmacist. Acetaminophen can cause liver damage if not taken correctly.    NSAIDs, such as ibuprofen, help decrease swelling, pain, and fever. This medicine is available with or without a doctor's order. NSAIDs can cause stomach bleeding or kidney problems in certain people. If you take blood thinner medicine, always ask your healthcare provider if NSAIDs are safe for you. Always read the medicine label and follow directions.    Prescription pain medicine may be given. Ask your healthcare provider how to take this medicine safely. Some prescription pain medicines contain acetaminophen. Do not take other medicines that contain acetaminophen without talking to your healthcare provider. Too much acetaminophen may cause liver damage. Prescription pain medicine may cause constipation. Ask your healthcare provider how to prevent or treat constipation.    Take your medicine as directed. Contact your healthcare provider if you think your medicine is not helping or if you have side effects. Tell your provider if you are allergic to any medicine. Keep a list of the medicines, vitamins, and herbs you take. Include the amounts, and when and why you take them. Bring the list or the pill bottles to follow-up visits. Carry your medicine list with you in case of an emergency.  Follow up with your doctor as directed: Write down your questions so you remember to ask them during your visits.    Self-care:    Rest your shoulder so it can heal. Avoid moving your shoulder as your injury heals. This will help decrease the risk of more damage to your shoulder.    Apply ice on your shoulder for 20 to 30 minutes every 2 hours or as directed. Use an ice pack, or put crushed ice in a plastic bag. Cover it with a towel before you apply it to your shoulder. Ice helps prevent tissue damage and decreases swelling and pain.    Compress your shoulder as directed. Compression provides support and helps decrease swelling and movement so your shoulder can heal. For mild sprains, you may be given a sling to support your arm. You may need a padded brace or a plaster cast to hold your shoulder in place if the sprain is more serious.  How to wear a brace, sling, or splint: A brace, sling, or splint may be needed to limit your movement and protect your injured shoulder.  Shoulder Sling    Wear your brace, sling, or splint as directed. You may need to wear it all the time and take it off only to bathe or do exercises. Ask your healthcare provider how long you should wear it.    Keep your skin clean and dry. Padding under your armpit will help absorb sweat and prevent sores on your skin.    Do not hunch your shoulders. This may cause pain. Keep your shoulders relaxed.    Position the sling over your arm and hand so that it also covers your knuckles. This will help the sling support your wrist and hand. Position your wrist higher than your elbow. Your wrist may start to hurt or go numb if your sling is too short.  Physical therapy: A physical therapist teaches you exercises to help improve movement and strength, and to decrease pain.    Prevent another injury:    Do not exercise when you are tired or in pain. Warm up and stretch before you exercise.    Wear equipment to protect yourself when you play sports.    Wear shoes that fit well and run on flat surfaces to prevent falls.

## 2024-10-25 NOTE — ED ADULT NURSE NOTE - NS ED NURSE REPORT GIVEN TO FT
----- Message from Neda sent at 10/25/2024 12:32 PM CDT -----  .Type: RX Refill Request    Who Called: Self     Have you contacted your pharmacy: No     Refill or New Rx: New Rx     RX Name and Strength:furosemide (LASIX) 40 MG tablet    Preferred Pharmacy with phone number:.  [id number] [address] AT [address] [address]  Phone: [phone] Fax: [phone]      Local or Mail Order: Local     Ordering Provider: JUAN A Carrasco    Would the patient rather a call back or a response via [facility]? Call Back     Best Call Back Number: .[phone] (home)       Additional Information:  
Lasix is not prescribed by me. Will need to contact his prescriber for refill.   
luh kam

## 2024-12-23 ENCOUNTER — APPOINTMENT (OUTPATIENT)
Dept: VASCULAR SURGERY | Facility: CLINIC | Age: 79
End: 2024-12-23
Payer: MEDICARE

## 2024-12-23 ENCOUNTER — APPOINTMENT (OUTPATIENT)
Dept: VASCULAR SURGERY | Facility: CLINIC | Age: 79
End: 2024-12-23

## 2024-12-23 VITALS
RESPIRATION RATE: 16 BRPM | HEART RATE: 71 BPM | OXYGEN SATURATION: 95 % | BODY MASS INDEX: 22.78 KG/M2 | SYSTOLIC BLOOD PRESSURE: 147 MMHG | WEIGHT: 116 LBS | HEIGHT: 60 IN | DIASTOLIC BLOOD PRESSURE: 86 MMHG

## 2024-12-23 DIAGNOSIS — I65.22 OCCLUSION AND STENOSIS OF LEFT CAROTID ARTERY: ICD-10-CM

## 2024-12-23 PROCEDURE — 93880 EXTRACRANIAL BILAT STUDY: CPT

## 2024-12-23 PROCEDURE — 99203 OFFICE O/P NEW LOW 30 MIN: CPT

## 2024-12-23 RX ORDER — ROSUVASTATIN CALCIUM 5 MG/1
5 TABLET, FILM COATED ORAL
Refills: 0 | Status: ACTIVE | COMMUNITY

## 2024-12-23 RX ORDER — LOSARTAN POTASSIUM 100 MG/1
TABLET, FILM COATED ORAL
Refills: 0 | Status: ACTIVE | COMMUNITY

## 2024-12-23 RX ORDER — GABAPENTIN 100 MG
100 TABLET ORAL
Refills: 0 | Status: ACTIVE | COMMUNITY

## 2025-03-20 ENCOUNTER — EMERGENCY (EMERGENCY)
Facility: HOSPITAL | Age: 80
LOS: 1 days | Discharge: HOME | End: 2025-03-20
Attending: EMERGENCY MEDICINE | Admitting: EMERGENCY MEDICINE
Payer: MEDICARE

## 2025-03-20 VITALS
WEIGHT: 116.84 LBS | HEIGHT: 60 IN | RESPIRATION RATE: 18 BRPM | DIASTOLIC BLOOD PRESSURE: 72 MMHG | OXYGEN SATURATION: 97 % | SYSTOLIC BLOOD PRESSURE: 191 MMHG | TEMPERATURE: 98 F | HEART RATE: 89 BPM

## 2025-03-20 VITALS
DIASTOLIC BLOOD PRESSURE: 57 MMHG | RESPIRATION RATE: 16 BRPM | SYSTOLIC BLOOD PRESSURE: 146 MMHG | OXYGEN SATURATION: 97 % | HEART RATE: 61 BPM

## 2025-03-20 PROCEDURE — 73060 X-RAY EXAM OF HUMERUS: CPT | Mod: 26,LT

## 2025-03-20 PROCEDURE — 73030 X-RAY EXAM OF SHOULDER: CPT

## 2025-03-20 PROCEDURE — 99284 EMERGENCY DEPT VISIT MOD MDM: CPT

## 2025-03-20 PROCEDURE — 72040 X-RAY EXAM NECK SPINE 2-3 VW: CPT

## 2025-03-20 PROCEDURE — 72040 X-RAY EXAM NECK SPINE 2-3 VW: CPT | Mod: 26

## 2025-03-20 PROCEDURE — 73060 X-RAY EXAM OF HUMERUS: CPT

## 2025-03-20 PROCEDURE — 73030 X-RAY EXAM OF SHOULDER: CPT | Mod: 26,LT

## 2025-03-20 RX ORDER — IBUPROFEN 200 MG
600 TABLET ORAL ONCE
Refills: 0 | Status: COMPLETED | OUTPATIENT
Start: 2025-03-20 | End: 2025-03-20

## 2025-03-20 RX ORDER — CYCLOBENZAPRINE HYDROCHLORIDE 15 MG/1
10 CAPSULE, EXTENDED RELEASE ORAL ONCE
Refills: 0 | Status: COMPLETED | OUTPATIENT
Start: 2025-03-20 | End: 2025-03-20

## 2025-03-20 RX ORDER — CYCLOBENZAPRINE HYDROCHLORIDE 15 MG/1
1 CAPSULE, EXTENDED RELEASE ORAL
Qty: 15 | Refills: 0
Start: 2025-03-20 | End: 2025-03-24

## 2025-03-20 RX ORDER — TRAMADOL HYDROCHLORIDE 50 MG/1
25 TABLET, FILM COATED ORAL ONCE
Refills: 0 | Status: DISCONTINUED | OUTPATIENT
Start: 2025-03-20 | End: 2025-03-20

## 2025-03-20 RX ORDER — LIDOCAINE HYDROCHLORIDE 20 MG/ML
1 JELLY TOPICAL ONCE
Refills: 0 | Status: COMPLETED | OUTPATIENT
Start: 2025-03-20 | End: 2025-03-20

## 2025-03-20 RX ORDER — LIDOCAINE HYDROCHLORIDE 20 MG/ML
1 JELLY TOPICAL
Qty: 10 | Refills: 0
Start: 2025-03-20 | End: 2025-03-29

## 2025-03-20 RX ORDER — PREDNISONE 20 MG/1
1 TABLET ORAL
Qty: 4 | Refills: 0
Start: 2025-03-20 | End: 2025-03-23

## 2025-03-20 RX ADMIN — Medication 600 MILLIGRAM(S): at 18:15

## 2025-03-20 RX ADMIN — Medication 600 MILLIGRAM(S): at 17:58

## 2025-03-20 RX ADMIN — TRAMADOL HYDROCHLORIDE 25 MILLIGRAM(S): 50 TABLET, FILM COATED ORAL at 18:15

## 2025-03-20 RX ADMIN — LIDOCAINE HYDROCHLORIDE 1 PATCH: 20 JELLY TOPICAL at 17:57

## 2025-03-20 RX ADMIN — CYCLOBENZAPRINE HYDROCHLORIDE 10 MILLIGRAM(S): 15 CAPSULE, EXTENDED RELEASE ORAL at 17:58

## 2025-03-20 RX ADMIN — TRAMADOL HYDROCHLORIDE 25 MILLIGRAM(S): 50 TABLET, FILM COATED ORAL at 17:58

## 2025-03-20 NOTE — ED PROVIDER NOTE - OBJECTIVE STATEMENT
Patient is a 79-year-old female who presents to the emergency room with left shoulder pain.  Past medical history of hypertension hypercholesterolemia bilateral carotid artery stenosis osteopenia.  Patient reports on Sunday she developed left shoulder pain which has progressively worsened rating down into the humeral area/elbow and up into the neck.  Patient is having a difficult time lifting her arm.  Tylenol helps somewhat but the pain comes back.  Denies any acute trauma.  Denies any fevers chills nausea vomiting chest pain shortness of breath or abdominal pain.  Denies any extremity numbness.  Patient writes with her right hand but predominantly does use her left.

## 2025-03-20 NOTE — ED PROVIDER NOTE - NSFOLLOWUPINSTRUCTIONS_ED_ALL_ED_FT
Return to the emergency room for any new or worsening symptoms  Take your medication as prescribed  Prednisone 1 tab daily you finish this prescription you can begin Motrin but do not take prednisone and Motrin together  Tylenol per label instructions as needed for mild to moderate pain  Flexeril per label instructions as needed for spasm be careful when taking this as it may be sedating  Lidoderm patch to affected region  Arm sling as needed for pain but continue to range the shoulder to prevent a frozen shoulder joint  Advance activity as tolerated  Follow up with orthopedics call tomorrow to schedule follow up       Tendinitis  Anatomy of the shoulder showing an inflamed tendon.  Tendinitis is irritation and swelling (inflammation) of a tendon. A tendon is a cord of tissue that connects muscle to bone. Tendinitis is most common in the shoulder, ankle, elbow, or wrist.    What are the causes?  Using a tendon or muscle too much (overuse). This is the most common cause.  Wear and tear that happens as you age.  Injury.  Some medical conditions, such as arthritis.  Some medicines.  What increases the risk?  You are more likely to get this condition if you do activities that involve the same movements over and over again (repetitive motions).    What are the signs or symptoms?  Pain.  Tenderness.  Mild swelling.  Decreased range of motion.  How is this treated?  This condition is usually treated with RICE therapy. RICE stands for:  Rest.  Ice.  Compression. This means putting pressure on the affected area.  Elevation. This means raising the affected area above the level of your heart.  Treatment may also include:  Medicines for swelling or pain.  Exercises or physical therapy to help your tendon move better and get stronger.  A brace or splint.  A shot (injection) of a type of medicine called corticosteroid.  Surgery. This is rarely needed.  Follow these instructions at home:  If you have a splint or brace that can be taken off:    Wear the splint or brace as told by your doctor. Take it off only as told by your doctor.  Check the skin around the splint or brace every day. Tell your doctor if you see problems.  Loosen the splint or brace if your fingers or toes:  Tingle.  Become numb.  Turn cold and blue.  Keep the splint or brace clean.  If the splint or brace is not waterproof:  Do not let it get wet.  Cover it with a watertight covering when you take a bath or shower, or take it off as told by your doctor.  Managing pain, stiffness, and swelling    Bag of ice on a towel on the skin.  A heating pad being used on the affected area.  If told, put ice on the affected area. To do this:  If you have a removable splint or brace, take it off as told by your doctor.  Put ice in a plastic bag.  Place a towel between your skin and the bag.  Leave the ice on for 20 minutes, 2–3 times a day.  Take off the ice if your skin turns bright red. This is very important. If you cannot feel pain, heat, or cold, you have a greater risk of damage to the area.  Move the fingers or toes of the affected arm or leg often, if this applies.  If told, raise the affected area above the level of your heart while you are sitting or lying down.  If told, put heat on the affected area before you exercise. Use the heat source that your doctor recommends, such as a moist heat pack or a heating pad.  Place a towel between your skin and the heat source.  Leave the heat on for 20–30 minutes.  Take off the heat if your skin turns bright red. This is very important. If you cannot feel pain, heat, or cold, you have a greater risk of getting burned.  Activity    Rest the affected area as told by your doctor.  Ask your doctor when it is safe to drive if you have a splint or brace on any part of your arm or leg.  Return to your normal activities when your doctor says that it is safe.  Avoid using the affected area while you have symptoms.  Do exercises as told by your doctor.  General instructions    Wear an elastic bandage or pressure (compression) wrap only as told by your doctor.  Take over-the-counter and prescription medicines only as told by your doctor.  Keep all follow-up visits.  Contact a doctor if:  You do not get better.  You get new problems, such as numbness in your hands or feet, and you do not know why.  Summary  Tendinitis is irritation and swelling (inflammation) of a tendon.  You are more likely to get this condition if you do activities that involve the same movements over and over again.  This condition is usually treated with RICE therapy. RICE stands for rest, ice, compression, and elevate.  Avoid using the affected area while you have symptoms.  This information is not intended to replace advice given to you by your health care provider. Make sure you discuss any questions you have with your health care provider.

## 2025-03-20 NOTE — ED PROVIDER NOTE - CLINICAL SUMMARY MEDICAL DECISION MAKING FREE TEXT BOX
Patient is a 79-year-old female who presents to the emergency room with left shoulder pain.  Past medical history of hypertension hypercholesterolemia bilateral carotid artery stenosis osteopenia.  Patient reports on Sunday she developed left shoulder pain which has progressively worsened rating down into the humeral area/elbow and up into the neck.  Patient is having a difficult time lifting her arm.  Tylenol helps somewhat but the pain comes back.  Denies any acute trauma.  Denies any fevers chills nausea vomiting chest pain shortness of breath or abdominal pain.  Denies any extremity numbness.  Patient writes with her right hand but predominantly does use her left. Patient is a 79-year-old female who presents to the emergency room with left shoulder pain.  Past medical history of hypertension hypercholesterolemia bilateral carotid artery stenosis osteopenia.  Patient reports on Sunday she developed left shoulder pain which has progressively worsened rating down into the humeral area/elbow and up into the neck.  Patient is having a difficult time lifting her arm.  Tylenol helps somewhat but the pain comes back.  Denies any acute trauma.  Denies any fevers chills nausea vomiting chest pain shortness of breath or abdominal pain.  Denies any extremity numbness.  Patient writes with her right hand but predominantly does use her left. Patient is presenting to the emergency room with left shoulder pain radicular nature.  Differential includes but not limited to possible cervical disc disease versus tendinosis of the shoulder versus additional acute other pathology as her range of motion is limited.  Must consider rotator cuff pathology on differential.  Obtain x-rays medicate for pain and monitor.  At this time patient is neuro vastly intact.  Independent review of left humeral x-ray reveals degenerative diseases no acute fracture independent review of cervical x-ray reveals no acute fracture independent review of left shoulder x-ray reveals degenerative changes or plate appears to be calcific tendinosis. Results of imaging reviewed with the patient.  Patient made aware that sometimes fractures in perfect alignment can be missed on initial imaging therefore prompt follow-up is recommended.  Also need aware that x-rays cannot exclude underlying ligament rotator cuff or tendon pathology.  Pain has improved.  Will discharge with outpatient follow-up and provide arm sling for comfort.

## 2025-03-20 NOTE — ED PROVIDER NOTE - PATIENT PORTAL LINK FT
You can access the FollowMyHealth Patient Portal offered by [facility] by registering at the following website: [email or web address]. By joining [facility]’s FollowMyHealth portal, you will also be able to view your health information using other applications (apps) compatible with our system.

## 2025-03-20 NOTE — ED ADULT TRIAGE NOTE - CHIEF COMPLAINT QUOTE
patient states since sunday she has had worsening left arm pain. tylenol works for a few hours but then pain comes back. denies trauma/injury.

## 2025-03-20 NOTE — ED PROVIDER NOTE - PHYSICAL EXAMINATION
no midline C/T/L TTP  TTP to left paraspinal muscle region   no skin rashes noted  Diffuse TTP to left shoulder with limited ROM secondary to pain no TTP to left elbow, forearm, wrist, hand NVI +radial pulse cap refill less then 2 seconds sensation grossly intact

## 2025-03-20 NOTE — ED PROVIDER NOTE - CARE PROVIDER_API CALL
Glen Fritz.  Orthopaedic Surgery  [address], [address]  [place] [address]  Phone: [phone]  Fax: [phone]  Follow Up Time:

## 2025-03-20 NOTE — ED PROVIDER NOTE - DIFFERENTIAL DIAGNOSIS
Differential Diagnosis Patient is presenting to the emergency room with left shoulder pain radicular nature.  Differential includes but not limited to possible cervical disc disease versus tendinosis of the shoulder versus additional acute other pathology as her range of motion is limited.  Must consider rotator cuff pathology on differential.  Obtain x-rays medicate for pain and monitor.  At this time patient is neuro vastly intact.

## 2025-03-31 ENCOUNTER — APPOINTMENT (OUTPATIENT)
Dept: ORTHOPEDIC SURGERY | Facility: CLINIC | Age: 80
End: 2025-03-31

## 2025-03-31 DIAGNOSIS — M75.32 CALCIFIC TENDINITIS OF LEFT SHOULDER: ICD-10-CM

## 2025-03-31 DIAGNOSIS — M47.812 SPONDYLOSIS W/OUT MYELOPATHY OR RADICULOPATHY, CERVICAL REGION: ICD-10-CM

## 2025-03-31 PROCEDURE — 20610 DRAIN/INJ JOINT/BURSA W/O US: CPT | Mod: LT

## 2025-03-31 PROCEDURE — 99203 OFFICE O/P NEW LOW 30 MIN: CPT | Mod: 25

## 2025-03-31 RX ORDER — METHYLPREDNISOLONE ACETATE 40 MG/ML
40 INJECTION, SUSPENSION INTRA-ARTICULAR; INTRALESIONAL; INTRAMUSCULAR; SOFT TISSUE
Refills: 0 | Status: COMPLETED | OUTPATIENT
Start: 2025-03-31

## 2025-04-15 ENCOUNTER — APPOINTMENT (OUTPATIENT)
Dept: MRI IMAGING | Facility: CLINIC | Age: 80
End: 2025-04-15
Payer: MEDICARE

## 2025-04-15 PROCEDURE — 72141 MRI NECK SPINE W/O DYE: CPT

## 2025-04-15 PROCEDURE — 72141 MRI NECK SPINE W/O DYE: CPT | Mod: TC,59

## 2025-04-21 ENCOUNTER — APPOINTMENT (OUTPATIENT)
Dept: ORTHOPEDIC SURGERY | Facility: CLINIC | Age: 80
End: 2025-04-21
Payer: MEDICARE

## 2025-04-21 DIAGNOSIS — M47.812 SPONDYLOSIS W/OUT MYELOPATHY OR RADICULOPATHY, CERVICAL REGION: ICD-10-CM

## 2025-04-21 DIAGNOSIS — M75.32 CALCIFIC TENDINITIS OF LEFT SHOULDER: ICD-10-CM

## 2025-04-21 PROCEDURE — 99214 OFFICE O/P EST MOD 30 MIN: CPT

## 2025-06-02 ENCOUNTER — APPOINTMENT (OUTPATIENT)
Dept: ORTHOPEDIC SURGERY | Facility: CLINIC | Age: 80
End: 2025-06-02